# Patient Record
Sex: FEMALE | Race: WHITE | NOT HISPANIC OR LATINO
[De-identification: names, ages, dates, MRNs, and addresses within clinical notes are randomized per-mention and may not be internally consistent; named-entity substitution may affect disease eponyms.]

---

## 2017-03-21 ENCOUNTER — APPOINTMENT (OUTPATIENT)
Dept: INTERNAL MEDICINE | Facility: CLINIC | Age: 82
End: 2017-03-21

## 2017-03-21 VITALS
SYSTOLIC BLOOD PRESSURE: 133 MMHG | HEART RATE: 72 BPM | WEIGHT: 206 LBS | BODY MASS INDEX: 38.89 KG/M2 | HEIGHT: 61 IN | DIASTOLIC BLOOD PRESSURE: 78 MMHG

## 2017-03-22 LAB
ALBUMIN SERPL ELPH-MCNC: 3.7 G/DL
ALP BLD-CCNC: 70 U/L
ALT SERPL-CCNC: 8 U/L
ANION GAP SERPL CALC-SCNC: 18 MMOL/L
AST SERPL-CCNC: 17 U/L
BASOPHILS # BLD AUTO: 0.04 K/UL
BASOPHILS NFR BLD AUTO: 0.4 %
BILIRUB SERPL-MCNC: 0.2 MG/DL
BUN SERPL-MCNC: 44 MG/DL
CALCIUM SERPL-MCNC: 9.4 MG/DL
CHLORIDE SERPL-SCNC: 102 MMOL/L
CHOLEST SERPL-MCNC: 196 MG/DL
CHOLEST/HDLC SERPL: 2.9 RATIO
CO2 SERPL-SCNC: 20 MMOL/L
CREAT SERPL-MCNC: 1.97 MG/DL
EOSINOPHIL # BLD AUTO: 0.26 K/UL
EOSINOPHIL NFR BLD AUTO: 2.8 %
FERRITIN SERPL-MCNC: 35.5 NG/ML
GLUCOSE SERPL-MCNC: 94 MG/DL
HCT VFR BLD CALC: 43.7 %
HDLC SERPL-MCNC: 67 MG/DL
HGB BLD-MCNC: 13.4 G/DL
IMM GRANULOCYTES NFR BLD AUTO: 0.3 %
IRON SATN MFR SERPL: 14 %
IRON SERPL-MCNC: 52 UG/DL
LDLC SERPL CALC-MCNC: 97 MG/DL
LYMPHOCYTES # BLD AUTO: 2.39 K/UL
LYMPHOCYTES NFR BLD AUTO: 25.6 %
MAN DIFF?: NORMAL
MCHC RBC-ENTMCNC: 27.3 PG
MCHC RBC-ENTMCNC: 30.7 GM/DL
MCV RBC AUTO: 89.2 FL
MONOCYTES # BLD AUTO: 0.74 K/UL
MONOCYTES NFR BLD AUTO: 7.9 %
NEUTROPHILS # BLD AUTO: 5.89 K/UL
NEUTROPHILS NFR BLD AUTO: 63 %
PLATELET # BLD AUTO: 259 K/UL
POTASSIUM SERPL-SCNC: 5.2 MMOL/L
PROT SERPL-MCNC: 7.4 G/DL
RBC # BLD: 4.9 M/UL
RBC # FLD: 16.1 %
SAVE SPECIMEN: NORMAL
SODIUM SERPL-SCNC: 140 MMOL/L
TIBC SERPL-MCNC: 365 UG/DL
TRIGL SERPL-MCNC: 158 MG/DL
UIBC SERPL-MCNC: 313 UG/DL
VIT B12 SERPL-MCNC: 328 PG/ML
WBC # FLD AUTO: 9.35 K/UL

## 2017-07-06 ENCOUNTER — NON-APPOINTMENT (OUTPATIENT)
Age: 82
End: 2017-07-06

## 2017-07-06 ENCOUNTER — APPOINTMENT (OUTPATIENT)
Dept: INTERNAL MEDICINE | Facility: CLINIC | Age: 82
End: 2017-07-06

## 2017-07-06 VITALS
SYSTOLIC BLOOD PRESSURE: 136 MMHG | WEIGHT: 201 LBS | TEMPERATURE: 97.5 F | BODY MASS INDEX: 37.95 KG/M2 | HEIGHT: 61 IN | HEART RATE: 102 BPM | DIASTOLIC BLOOD PRESSURE: 75 MMHG

## 2017-07-06 RX ORDER — SULFAMETHOXAZOLE AND TRIMETHOPRIM 800; 160 MG/1; MG/1
800-160 TABLET ORAL
Qty: 20 | Refills: 0 | Status: DISCONTINUED | COMMUNITY
Start: 2017-06-06

## 2017-07-07 LAB
ALBUMIN SERPL ELPH-MCNC: 3.7 G/DL
ALP BLD-CCNC: 71 U/L
ALT SERPL-CCNC: 12 U/L
ANION GAP SERPL CALC-SCNC: 19 MMOL/L
AST SERPL-CCNC: 22 U/L
BASOPHILS # BLD AUTO: 0.02 K/UL
BASOPHILS NFR BLD AUTO: 0.2 %
BILIRUB SERPL-MCNC: 0.3 MG/DL
BUN SERPL-MCNC: 44 MG/DL
CALCIUM SERPL-MCNC: 10.1 MG/DL
CHLORIDE SERPL-SCNC: 104 MMOL/L
CHOLEST SERPL-MCNC: 202 MG/DL
CHOLEST/HDLC SERPL: 4 RATIO
CO2 SERPL-SCNC: 18 MMOL/L
CREAT SERPL-MCNC: 2.25 MG/DL
EOSINOPHIL # BLD AUTO: 0.2 K/UL
EOSINOPHIL NFR BLD AUTO: 2 %
GLUCOSE SERPL-MCNC: 127 MG/DL
HCT VFR BLD CALC: 41.8 %
HDLC SERPL-MCNC: 51 MG/DL
HGB BLD-MCNC: 13 G/DL
IMM GRANULOCYTES NFR BLD AUTO: 0.3 %
LDLC SERPL CALC-MCNC: 118 MG/DL
LYMPHOCYTES # BLD AUTO: 2.18 K/UL
LYMPHOCYTES NFR BLD AUTO: 22 %
MAN DIFF?: NORMAL
MCHC RBC-ENTMCNC: 27.9 PG
MCHC RBC-ENTMCNC: 31.1 GM/DL
MCV RBC AUTO: 89.7 FL
MONOCYTES # BLD AUTO: 0.79 K/UL
MONOCYTES NFR BLD AUTO: 8 %
NEUTROPHILS # BLD AUTO: 6.68 K/UL
NEUTROPHILS NFR BLD AUTO: 67.5 %
PLATELET # BLD AUTO: 228 K/UL
POTASSIUM SERPL-SCNC: 5.1 MMOL/L
PROT SERPL-MCNC: 7.2 G/DL
RBC # BLD: 4.66 M/UL
RBC # FLD: 17.6 %
SAVE SPECIMEN: NORMAL
SODIUM SERPL-SCNC: 141 MMOL/L
TRIGL SERPL-MCNC: 167 MG/DL
WBC # FLD AUTO: 9.9 K/UL

## 2017-07-24 ENCOUNTER — APPOINTMENT (OUTPATIENT)
Dept: INTERNAL MEDICINE | Facility: CLINIC | Age: 82
End: 2017-07-24

## 2017-07-24 VITALS
HEIGHT: 61 IN | HEART RATE: 88 BPM | WEIGHT: 201 LBS | SYSTOLIC BLOOD PRESSURE: 130 MMHG | BODY MASS INDEX: 37.95 KG/M2 | DIASTOLIC BLOOD PRESSURE: 75 MMHG | TEMPERATURE: 98.1 F

## 2017-07-31 ENCOUNTER — APPOINTMENT (OUTPATIENT)
Dept: INTERNAL MEDICINE | Facility: CLINIC | Age: 82
End: 2017-07-31
Payer: MEDICARE

## 2017-07-31 VITALS
HEART RATE: 86 BPM | BODY MASS INDEX: 37.95 KG/M2 | WEIGHT: 201 LBS | SYSTOLIC BLOOD PRESSURE: 187 MMHG | DIASTOLIC BLOOD PRESSURE: 106 MMHG | HEIGHT: 61 IN

## 2017-07-31 PROCEDURE — 36415 COLL VENOUS BLD VENIPUNCTURE: CPT

## 2017-07-31 PROCEDURE — 99214 OFFICE O/P EST MOD 30 MIN: CPT | Mod: 25

## 2017-08-01 LAB
ALBUMIN SERPL ELPH-MCNC: 3.4 G/DL
ALP BLD-CCNC: 67 U/L
ALT SERPL-CCNC: 11 U/L
ANION GAP SERPL CALC-SCNC: 16 MMOL/L
AST SERPL-CCNC: 15 U/L
BASOPHILS # BLD AUTO: 0.03 K/UL
BASOPHILS NFR BLD AUTO: 0.2 %
BILIRUB SERPL-MCNC: 0.3 MG/DL
BUN SERPL-MCNC: 36 MG/DL
CALCIUM SERPL-MCNC: 9.1 MG/DL
CHLORIDE SERPL-SCNC: 102 MMOL/L
CHOLEST SERPL-MCNC: 162 MG/DL
CHOLEST/HDLC SERPL: 3.1 RATIO
CO2 SERPL-SCNC: 21 MMOL/L
CREAT SERPL-MCNC: 1.55 MG/DL
EOSINOPHIL # BLD AUTO: 0.33 K/UL
EOSINOPHIL NFR BLD AUTO: 2.3 %
GLUCOSE SERPL-MCNC: 96 MG/DL
HCT VFR BLD CALC: 39.6 %
HDLC SERPL-MCNC: 52 MG/DL
HGB BLD-MCNC: 12.4 G/DL
IMM GRANULOCYTES NFR BLD AUTO: 1 %
LDLC SERPL CALC-MCNC: 86 MG/DL
LYMPHOCYTES # BLD AUTO: 3.44 K/UL
LYMPHOCYTES NFR BLD AUTO: 23.8 %
MAN DIFF?: NORMAL
MCHC RBC-ENTMCNC: 28.1 PG
MCHC RBC-ENTMCNC: 31.3 GM/DL
MCV RBC AUTO: 89.8 FL
MONOCYTES # BLD AUTO: 1.02 K/UL
MONOCYTES NFR BLD AUTO: 7.1 %
NEUTROPHILS # BLD AUTO: 9.49 K/UL
NEUTROPHILS NFR BLD AUTO: 65.6 %
PLATELET # BLD AUTO: 301 K/UL
POTASSIUM SERPL-SCNC: 5.1 MMOL/L
PROT SERPL-MCNC: 7.3 G/DL
RBC # BLD: 4.41 M/UL
RBC # FLD: 16.9 %
SAVE SPECIMEN: NORMAL
SODIUM SERPL-SCNC: 139 MMOL/L
TRIGL SERPL-MCNC: 121 MG/DL
WBC # FLD AUTO: 14.46 K/UL

## 2017-08-07 ENCOUNTER — FORM ENCOUNTER (OUTPATIENT)
Age: 82
End: 2017-08-07

## 2017-08-08 ENCOUNTER — OUTPATIENT (OUTPATIENT)
Dept: OUTPATIENT SERVICES | Facility: HOSPITAL | Age: 82
LOS: 1 days | End: 2017-08-08
Payer: MEDICARE

## 2017-08-08 ENCOUNTER — APPOINTMENT (OUTPATIENT)
Dept: MRI IMAGING | Facility: CLINIC | Age: 82
End: 2017-08-08
Payer: MEDICARE

## 2017-08-08 DIAGNOSIS — Z96.659 PRESENCE OF UNSPECIFIED ARTIFICIAL KNEE JOINT: Chronic | ICD-10-CM

## 2017-08-08 DIAGNOSIS — Z98.89 OTHER SPECIFIED POSTPROCEDURAL STATES: Chronic | ICD-10-CM

## 2017-08-08 DIAGNOSIS — M54.16 RADICULOPATHY, LUMBAR REGION: ICD-10-CM

## 2017-08-08 DIAGNOSIS — Z00.8 ENCOUNTER FOR OTHER GENERAL EXAMINATION: ICD-10-CM

## 2017-08-08 PROCEDURE — 72148 MRI LUMBAR SPINE W/O DYE: CPT

## 2017-08-08 PROCEDURE — 72148 MRI LUMBAR SPINE W/O DYE: CPT | Mod: 26

## 2017-08-15 ENCOUNTER — APPOINTMENT (OUTPATIENT)
Dept: INTERNAL MEDICINE | Facility: CLINIC | Age: 82
End: 2017-08-15
Payer: MEDICARE

## 2017-08-15 ENCOUNTER — NON-APPOINTMENT (OUTPATIENT)
Age: 82
End: 2017-08-15

## 2017-08-15 VITALS
WEIGHT: 200 LBS | SYSTOLIC BLOOD PRESSURE: 159 MMHG | DIASTOLIC BLOOD PRESSURE: 89 MMHG | BODY MASS INDEX: 37.76 KG/M2 | HEART RATE: 76 BPM | HEIGHT: 61 IN

## 2017-08-15 PROCEDURE — 93000 ELECTROCARDIOGRAM COMPLETE: CPT

## 2017-08-15 PROCEDURE — 36415 COLL VENOUS BLD VENIPUNCTURE: CPT

## 2017-08-15 PROCEDURE — 99215 OFFICE O/P EST HI 40 MIN: CPT | Mod: 25

## 2017-08-15 RX ORDER — PREDNISONE 20 MG/1
20 TABLET ORAL
Qty: 10 | Refills: 0 | Status: DISCONTINUED | COMMUNITY
Start: 2017-07-31 | End: 2017-08-15

## 2017-08-15 RX ORDER — PREDNISONE 20 MG/1
20 TABLET ORAL
Qty: 5 | Refills: 0 | Status: DISCONTINUED | COMMUNITY
Start: 2017-07-24 | End: 2017-08-15

## 2017-08-16 LAB
ALBUMIN SERPL ELPH-MCNC: 3.5 G/DL
ALP BLD-CCNC: 63 U/L
ALT SERPL-CCNC: 4 U/L
ANION GAP SERPL CALC-SCNC: 15 MMOL/L
AST SERPL-CCNC: 10 U/L
BASOPHILS # BLD AUTO: 0.02 K/UL
BASOPHILS NFR BLD AUTO: 0.2 %
BILIRUB SERPL-MCNC: 0.4 MG/DL
BUN SERPL-MCNC: 36 MG/DL
CALCIUM SERPL-MCNC: 9.3 MG/DL
CHLORIDE SERPL-SCNC: 101 MMOL/L
CO2 SERPL-SCNC: 24 MMOL/L
CREAT SERPL-MCNC: 1.92 MG/DL
EOSINOPHIL # BLD AUTO: 0.47 K/UL
EOSINOPHIL NFR BLD AUTO: 5.2 %
GLUCOSE SERPL-MCNC: 95 MG/DL
HCT VFR BLD CALC: 40.2 %
HGB BLD-MCNC: 12.3 G/DL
IMM GRANULOCYTES NFR BLD AUTO: 0.2 %
LYMPHOCYTES # BLD AUTO: 2.28 K/UL
LYMPHOCYTES NFR BLD AUTO: 25.3 %
MAN DIFF?: NORMAL
MCHC RBC-ENTMCNC: 27.8 PG
MCHC RBC-ENTMCNC: 30.6 GM/DL
MCV RBC AUTO: 91 FL
MONOCYTES # BLD AUTO: 1.06 K/UL
MONOCYTES NFR BLD AUTO: 11.8 %
NEUTROPHILS # BLD AUTO: 5.16 K/UL
NEUTROPHILS NFR BLD AUTO: 57.3 %
PLATELET # BLD AUTO: 226 K/UL
POTASSIUM SERPL-SCNC: 4.8 MMOL/L
PROT SERPL-MCNC: 7.2 G/DL
RBC # BLD: 4.42 M/UL
RBC # FLD: 17.3 %
SAVE SPECIMEN: NORMAL
SODIUM SERPL-SCNC: 140 MMOL/L
WBC # FLD AUTO: 9.01 K/UL

## 2017-08-21 ENCOUNTER — APPOINTMENT (OUTPATIENT)
Dept: INTERNAL MEDICINE | Facility: CLINIC | Age: 82
End: 2017-08-21
Payer: MEDICARE

## 2017-08-21 VITALS
DIASTOLIC BLOOD PRESSURE: 91 MMHG | HEIGHT: 61 IN | BODY MASS INDEX: 37.76 KG/M2 | HEART RATE: 105 BPM | SYSTOLIC BLOOD PRESSURE: 159 MMHG | WEIGHT: 200 LBS

## 2017-08-21 PROCEDURE — 99213 OFFICE O/P EST LOW 20 MIN: CPT

## 2017-08-28 ENCOUNTER — RX RENEWAL (OUTPATIENT)
Age: 82
End: 2017-08-28

## 2017-09-25 ENCOUNTER — MED ADMIN CHARGE (OUTPATIENT)
Age: 82
End: 2017-09-25

## 2017-09-25 ENCOUNTER — APPOINTMENT (OUTPATIENT)
Dept: INTERNAL MEDICINE | Facility: CLINIC | Age: 82
End: 2017-09-25
Payer: MEDICARE

## 2017-09-25 ENCOUNTER — RX RENEWAL (OUTPATIENT)
Age: 82
End: 2017-09-25

## 2017-09-25 VITALS
SYSTOLIC BLOOD PRESSURE: 163 MMHG | BODY MASS INDEX: 37.76 KG/M2 | DIASTOLIC BLOOD PRESSURE: 90 MMHG | WEIGHT: 200 LBS | HEART RATE: 74 BPM | HEIGHT: 61 IN

## 2017-09-25 PROCEDURE — 90686 IIV4 VACC NO PRSV 0.5 ML IM: CPT

## 2017-09-25 PROCEDURE — 99214 OFFICE O/P EST MOD 30 MIN: CPT | Mod: 25

## 2017-09-25 PROCEDURE — G0008: CPT

## 2017-10-24 ENCOUNTER — RX RENEWAL (OUTPATIENT)
Age: 82
End: 2017-10-24

## 2017-11-28 ENCOUNTER — APPOINTMENT (OUTPATIENT)
Dept: INTERNAL MEDICINE | Facility: CLINIC | Age: 82
End: 2017-11-28
Payer: MEDICARE

## 2017-11-28 ENCOUNTER — LABORATORY RESULT (OUTPATIENT)
Age: 82
End: 2017-11-28

## 2017-11-28 ENCOUNTER — NON-APPOINTMENT (OUTPATIENT)
Age: 82
End: 2017-11-28

## 2017-11-28 VITALS
HEIGHT: 61 IN | DIASTOLIC BLOOD PRESSURE: 72 MMHG | HEART RATE: 81 BPM | WEIGHT: 192 LBS | SYSTOLIC BLOOD PRESSURE: 113 MMHG | BODY MASS INDEX: 36.25 KG/M2

## 2017-11-28 PROCEDURE — 99214 OFFICE O/P EST MOD 30 MIN: CPT | Mod: 25

## 2017-11-28 PROCEDURE — 36415 COLL VENOUS BLD VENIPUNCTURE: CPT

## 2017-11-28 PROCEDURE — 93000 ELECTROCARDIOGRAM COMPLETE: CPT

## 2017-11-29 LAB
BASOPHILS # BLD AUTO: 0.03 K/UL
BASOPHILS NFR BLD AUTO: 0.4 %
CHOLEST SERPL-MCNC: 157 MG/DL
CHOLEST/HDLC SERPL: 2.6 RATIO
EOSINOPHIL # BLD AUTO: 0.18 K/UL
EOSINOPHIL NFR BLD AUTO: 2.2 %
GLUCOSE SERPL-MCNC: 111 MG/DL
HCT VFR BLD CALC: 43.2 %
HDLC SERPL-MCNC: 60 MG/DL
HGB BLD-MCNC: 13.2 G/DL
IMM GRANULOCYTES NFR BLD AUTO: 0.1 %
LDLC SERPL CALC-MCNC: 75 MG/DL
LYMPHOCYTES # BLD AUTO: 2.3 K/UL
LYMPHOCYTES NFR BLD AUTO: 28.3 %
MAN DIFF?: NORMAL
MCHC RBC-ENTMCNC: 28 PG
MCHC RBC-ENTMCNC: 30.6 GM/DL
MCV RBC AUTO: 91.5 FL
MONOCYTES # BLD AUTO: 0.61 K/UL
MONOCYTES NFR BLD AUTO: 7.5 %
NEUTROPHILS # BLD AUTO: 5 K/UL
NEUTROPHILS NFR BLD AUTO: 61.5 %
PLATELET # BLD AUTO: 255 K/UL
RBC # FLD: 16.9 %
SAVE SPECIMEN: NORMAL
TRIGL SERPL-MCNC: 111 MG/DL
WBC # FLD AUTO: 8.13 K/UL

## 2017-11-29 RX ORDER — CIPROFLOXACIN HYDROCHLORIDE 500 MG/1
TABLET, FILM COATED ORAL
Refills: 0 | Status: DISCONTINUED | COMMUNITY
End: 2017-11-29

## 2017-12-14 LAB
ALBUMIN SERPL ELPH-MCNC: 3.5 G/DL
ALP BLD-CCNC: 62 U/L
ALT SERPL-CCNC: 9 U/L
ANION GAP SERPL CALC-SCNC: 20 MMOL/L
AST SERPL-CCNC: 15 U/L
BILIRUB SERPL-MCNC: 0.3 MG/DL
BUN SERPL-MCNC: 45 MG/DL
CALCIUM SERPL-MCNC: 10 MG/DL
CHLORIDE SERPL-SCNC: 102 MMOL/L
CO2 SERPL-SCNC: 19 MMOL/L
CREAT SERPL-MCNC: 2.34 MG/DL
POTASSIUM SERPL-SCNC: 5.5 MMOL/L
PROT SERPL-MCNC: 6.8 G/DL
SODIUM SERPL-SCNC: 141 MMOL/L

## 2018-01-30 ENCOUNTER — APPOINTMENT (OUTPATIENT)
Dept: INTERNAL MEDICINE | Facility: CLINIC | Age: 83
End: 2018-01-30

## 2018-02-06 ENCOUNTER — APPOINTMENT (OUTPATIENT)
Dept: INTERNAL MEDICINE | Facility: CLINIC | Age: 83
End: 2018-02-06
Payer: MEDICARE

## 2018-02-06 ENCOUNTER — LABORATORY RESULT (OUTPATIENT)
Age: 83
End: 2018-02-06

## 2018-02-06 ENCOUNTER — APPOINTMENT (OUTPATIENT)
Dept: WOUND CARE | Facility: CLINIC | Age: 83
End: 2018-02-06
Payer: MEDICARE

## 2018-02-06 VITALS
HEIGHT: 61 IN | BODY MASS INDEX: 36.06 KG/M2 | WEIGHT: 191 LBS | RESPIRATION RATE: 16 BRPM | SYSTOLIC BLOOD PRESSURE: 148 MMHG | TEMPERATURE: 97.6 F | DIASTOLIC BLOOD PRESSURE: 63 MMHG | HEART RATE: 77 BPM

## 2018-02-06 VITALS
HEART RATE: 76 BPM | BODY MASS INDEX: 36.09 KG/M2 | DIASTOLIC BLOOD PRESSURE: 80 MMHG | SYSTOLIC BLOOD PRESSURE: 140 MMHG | WEIGHT: 191 LBS

## 2018-02-06 DIAGNOSIS — R26.81 UNSTEADINESS ON FEET: ICD-10-CM

## 2018-02-06 DIAGNOSIS — Z86.711 PERSONAL HISTORY OF PULMONARY EMBOLISM: ICD-10-CM

## 2018-02-06 DIAGNOSIS — Z82.49 FAMILY HISTORY OF ISCHEMIC HEART DISEASE AND OTHER DISEASES OF THE CIRCULATORY SYSTEM: ICD-10-CM

## 2018-02-06 DIAGNOSIS — Z86.79 PERSONAL HISTORY OF OTHER DISEASES OF THE CIRCULATORY SYSTEM: ICD-10-CM

## 2018-02-06 DIAGNOSIS — M54.16 RADICULOPATHY, LUMBAR REGION: ICD-10-CM

## 2018-02-06 PROCEDURE — 99204 OFFICE O/P NEW MOD 45 MIN: CPT

## 2018-02-06 PROCEDURE — 99214 OFFICE O/P EST MOD 30 MIN: CPT | Mod: 25

## 2018-02-06 PROCEDURE — 36415 COLL VENOUS BLD VENIPUNCTURE: CPT

## 2018-02-07 PROBLEM — M54.16 LUMBAR RADICULOPATHY: Status: RESOLVED | Noted: 2017-07-31 | Resolved: 2018-02-07

## 2018-02-07 LAB
ALBUMIN SERPL ELPH-MCNC: 4 G/DL
ALP BLD-CCNC: 58 U/L
ALT SERPL-CCNC: 9 U/L
ANION GAP SERPL CALC-SCNC: 16 MMOL/L
AST SERPL-CCNC: 18 U/L
BASOPHILS # BLD AUTO: 0.04 K/UL
BASOPHILS NFR BLD AUTO: 0.4 %
BILIRUB SERPL-MCNC: 0.4 MG/DL
BUN SERPL-MCNC: 35 MG/DL
CALCIUM SERPL-MCNC: 10 MG/DL
CHLORIDE SERPL-SCNC: 103 MMOL/L
CHOLEST SERPL-MCNC: 170 MG/DL
CHOLEST/HDLC SERPL: 2.6 RATIO
CO2 SERPL-SCNC: 24 MMOL/L
CREAT SERPL-MCNC: 1.64 MG/DL
EOSINOPHIL # BLD AUTO: 0.37 K/UL
EOSINOPHIL NFR BLD AUTO: 4.1 %
GLUCOSE SERPL-MCNC: 107 MG/DL
HCT VFR BLD CALC: 44.9 %
HDLC SERPL-MCNC: 65 MG/DL
HGB BLD-MCNC: 13.8 G/DL
IMM GRANULOCYTES NFR BLD AUTO: 0.2 %
LDLC SERPL CALC-MCNC: 74 MG/DL
LYMPHOCYTES # BLD AUTO: 2.18 K/UL
LYMPHOCYTES NFR BLD AUTO: 24.3 %
MAN DIFF?: NORMAL
MCHC RBC-ENTMCNC: 28.9 PG
MCHC RBC-ENTMCNC: 30.7 GM/DL
MCV RBC AUTO: 93.9 FL
MONOCYTES # BLD AUTO: 0.74 K/UL
MONOCYTES NFR BLD AUTO: 8.2 %
NEUTROPHILS # BLD AUTO: 5.62 K/UL
NEUTROPHILS NFR BLD AUTO: 62.8 %
PLATELET # BLD AUTO: 256 K/UL
POTASSIUM SERPL-SCNC: 5.7 MMOL/L
PROT SERPL-MCNC: 7.5 G/DL
RBC # BLD: 4.78 M/UL
RBC # FLD: 18.1 %
SODIUM SERPL-SCNC: 143 MMOL/L
TRIGL SERPL-MCNC: 156 MG/DL
WBC # FLD AUTO: 8.97 K/UL

## 2018-02-23 ENCOUNTER — APPOINTMENT (OUTPATIENT)
Dept: WOUND CARE | Facility: CLINIC | Age: 83
End: 2018-02-23
Payer: MEDICARE

## 2018-02-23 VITALS
DIASTOLIC BLOOD PRESSURE: 79 MMHG | WEIGHT: 191 LBS | HEART RATE: 65 BPM | RESPIRATION RATE: 16 BRPM | HEIGHT: 61 IN | TEMPERATURE: 97 F | SYSTOLIC BLOOD PRESSURE: 135 MMHG | BODY MASS INDEX: 36.06 KG/M2

## 2018-02-23 DIAGNOSIS — M54.16 RADICULOPATHY, LUMBAR REGION: ICD-10-CM

## 2018-02-23 DIAGNOSIS — Z85.3 PERSONAL HISTORY OF MALIGNANT NEOPLASM OF BREAST: ICD-10-CM

## 2018-02-23 PROCEDURE — 99213 OFFICE O/P EST LOW 20 MIN: CPT

## 2018-03-15 PROBLEM — M54.16 LUMBAR RADICULOPATHY: Status: ACTIVE | Noted: 2017-08-21

## 2018-03-23 ENCOUNTER — APPOINTMENT (OUTPATIENT)
Dept: WOUND CARE | Facility: CLINIC | Age: 83
End: 2018-03-23

## 2018-03-27 ENCOUNTER — APPOINTMENT (OUTPATIENT)
Dept: INTERNAL MEDICINE | Facility: CLINIC | Age: 83
End: 2018-03-27
Payer: MEDICARE

## 2018-03-27 VITALS
WEIGHT: 189 LBS | DIASTOLIC BLOOD PRESSURE: 76 MMHG | SYSTOLIC BLOOD PRESSURE: 129 MMHG | HEART RATE: 69 BPM | HEIGHT: 61 IN | BODY MASS INDEX: 35.68 KG/M2

## 2018-03-27 PROCEDURE — 99214 OFFICE O/P EST MOD 30 MIN: CPT

## 2018-04-17 ENCOUNTER — NON-APPOINTMENT (OUTPATIENT)
Age: 83
End: 2018-04-17

## 2018-04-17 ENCOUNTER — APPOINTMENT (OUTPATIENT)
Dept: INTERNAL MEDICINE | Facility: CLINIC | Age: 83
End: 2018-04-17
Payer: MEDICARE

## 2018-04-17 VITALS
SYSTOLIC BLOOD PRESSURE: 128 MMHG | BODY MASS INDEX: 35.3 KG/M2 | WEIGHT: 187 LBS | DIASTOLIC BLOOD PRESSURE: 80 MMHG | HEART RATE: 94 BPM | HEIGHT: 61 IN

## 2018-04-17 DIAGNOSIS — R55 SYNCOPE AND COLLAPSE: ICD-10-CM

## 2018-04-17 DIAGNOSIS — L89.312 PRESSURE ULCER OF RIGHT BUTTOCK, STAGE 2: ICD-10-CM

## 2018-04-17 DIAGNOSIS — Z87.81 PERSONAL HISTORY OF (HEALED) TRAUMATIC FRACTURE: ICD-10-CM

## 2018-04-17 DIAGNOSIS — M54.30 SCIATICA, UNSPECIFIED SIDE: ICD-10-CM

## 2018-04-17 PROCEDURE — 99214 OFFICE O/P EST MOD 30 MIN: CPT | Mod: 25

## 2018-04-17 PROCEDURE — 93000 ELECTROCARDIOGRAM COMPLETE: CPT

## 2018-04-17 RX ORDER — CEPHALEXIN 500 MG/1
500 CAPSULE ORAL 3 TIMES DAILY
Qty: 21 | Refills: 0 | Status: DISCONTINUED | COMMUNITY
Start: 2018-03-27 | End: 2018-04-17

## 2018-07-17 ENCOUNTER — APPOINTMENT (OUTPATIENT)
Dept: INTERNAL MEDICINE | Facility: CLINIC | Age: 83
End: 2018-07-17
Payer: MEDICARE

## 2018-07-17 ENCOUNTER — LABORATORY RESULT (OUTPATIENT)
Age: 83
End: 2018-07-17

## 2018-07-17 VITALS
HEART RATE: 67 BPM | SYSTOLIC BLOOD PRESSURE: 98 MMHG | HEIGHT: 61 IN | WEIGHT: 187 LBS | DIASTOLIC BLOOD PRESSURE: 76 MMHG | BODY MASS INDEX: 35.3 KG/M2

## 2018-07-17 DIAGNOSIS — Z01.818 ENCOUNTER FOR OTHER PREPROCEDURAL EXAMINATION: ICD-10-CM

## 2018-07-17 DIAGNOSIS — G50.0 TRIGEMINAL NEURALGIA: ICD-10-CM

## 2018-07-17 DIAGNOSIS — S80.11XA CONTUSION OF RIGHT LOWER LEG, INITIAL ENCOUNTER: ICD-10-CM

## 2018-07-17 PROCEDURE — 99214 OFFICE O/P EST MOD 30 MIN: CPT | Mod: 25

## 2018-07-17 PROCEDURE — 36415 COLL VENOUS BLD VENIPUNCTURE: CPT

## 2018-07-18 LAB
ALBUMIN SERPL ELPH-MCNC: 4.1 G/DL
ALP BLD-CCNC: 64 U/L
ALT SERPL-CCNC: 12 U/L
ANION GAP SERPL CALC-SCNC: 18 MMOL/L
AST SERPL-CCNC: 20 U/L
BASOPHILS # BLD AUTO: 0.03 K/UL
BASOPHILS NFR BLD AUTO: 0.4 %
BILIRUB SERPL-MCNC: 0.3 MG/DL
BUN SERPL-MCNC: 46 MG/DL
CALCIUM SERPL-MCNC: 9.4 MG/DL
CHLORIDE SERPL-SCNC: 102 MMOL/L
CHOLEST SERPL-MCNC: 146 MG/DL
CHOLEST/HDLC SERPL: 2.3 RATIO
CO2 SERPL-SCNC: 19 MMOL/L
CREAT SERPL-MCNC: 2.48 MG/DL
EOSINOPHIL # BLD AUTO: 0.18 K/UL
EOSINOPHIL NFR BLD AUTO: 2.2 %
GLUCOSE SERPL-MCNC: 80 MG/DL
HCT VFR BLD CALC: 40.3 %
HDLC SERPL-MCNC: 64 MG/DL
HGB BLD-MCNC: 13 G/DL
IMM GRANULOCYTES NFR BLD AUTO: 0.1 %
LDLC SERPL CALC-MCNC: 58 MG/DL
LYMPHOCYTES # BLD AUTO: 2.2 K/UL
LYMPHOCYTES NFR BLD AUTO: 27.4 %
MAN DIFF?: NORMAL
MCHC RBC-ENTMCNC: 31.9 PG
MCHC RBC-ENTMCNC: 32.3 GM/DL
MCV RBC AUTO: 99 FL
MONOCYTES # BLD AUTO: 0.6 K/UL
MONOCYTES NFR BLD AUTO: 7.5 %
NEUTROPHILS # BLD AUTO: 5 K/UL
NEUTROPHILS NFR BLD AUTO: 62.4 %
PLATELET # BLD AUTO: 248 K/UL
POTASSIUM SERPL-SCNC: 6.6 MMOL/L
PROT SERPL-MCNC: 6.8 G/DL
RBC # BLD: 4.07 M/UL
RBC # FLD: 15.2 %
SAVE SPECIMEN: NORMAL
SODIUM SERPL-SCNC: 139 MMOL/L
TRIGL SERPL-MCNC: 118 MG/DL
WBC # FLD AUTO: 8.02 K/UL

## 2018-07-18 NOTE — PHYSICAL EXAM
[No Acute Distress] : no acute distress [Well Nourished] : well nourished [Well Developed] : well developed [Supple] : supple [No Lymphadenopathy] : no lymphadenopathy [No Respiratory Distress] : no respiratory distress  [Clear to Auscultation] : lungs were clear to auscultation bilaterally [No Accessory Muscle Use] : no accessory muscle use [Normal Rate] : normal rate  [No Edema] : there was no peripheral edema [Soft] : abdomen soft [Non Tender] : non-tender [No HSM] : no HSM [Normal Bowel Sounds] : normal bowel sounds [de-identified] : Severely discolred feet bilaterally.

## 2018-07-18 NOTE — REVIEW OF SYSTEMS
[Fever] : no fever [Chills] : no chills [Night Sweats] : no night sweats [Hearing Loss] : hearing loss [Chest Pain] : no chest pain [Palpitations] : no palpitations [Orthopnea] : no orthopnea [Abdominal Pain] : no abdominal pain [Nausea] : no nausea [Vomiting] : no vomiting [Heartburn] : no heartburn

## 2018-07-18 NOTE — ADDENDUM
[FreeTextEntry1] : Urinary Frequency:\par No work up necessary.\par \par Contusion of Right calf:\par Skin not broken.  No specific treatment\par \par Hypertension:\par Blood pressure is relatively low.\par Will stop Hydrochlorothiazide.\par \par Trigeminal Neuralgia:\par Continue Neurontin\par \par Morbid Obesity:\par Told to watch her calories.\par \par CKD:\par Get BUN and Creatinine.

## 2018-07-18 NOTE — HISTORY OF PRESENT ILLNESS
[FreeTextEntry1] : CC: The patient is here to follow up on her chronic medical problems.\par  [de-identified] : HPI:\par Urinary Burning- was started on antibiotics.\par Bactrim.\par \par Urinary Frequency:\par Chronic problem but getting worse.\par \par CKD:\par No symptoms.\par \par Banged her right cald on the step about 3 days ago.  Denies pain or bleeding.\par Covered the area with a gauze pad.\par \par Trigeminal Neuralgia:\par Had some symptoms and she increased her Neurontin which seemed to help with facial pain.\par

## 2018-07-27 ENCOUNTER — APPOINTMENT (OUTPATIENT)
Dept: INTERNAL MEDICINE | Facility: CLINIC | Age: 83
End: 2018-07-27
Payer: MEDICARE

## 2018-07-27 ENCOUNTER — LABORATORY RESULT (OUTPATIENT)
Age: 83
End: 2018-07-27

## 2018-07-27 PROCEDURE — 36415 COLL VENOUS BLD VENIPUNCTURE: CPT

## 2018-07-28 ENCOUNTER — CLINICAL ADVICE (OUTPATIENT)
Age: 83
End: 2018-07-28

## 2018-07-30 ENCOUNTER — APPOINTMENT (OUTPATIENT)
Dept: INTERNAL MEDICINE | Facility: CLINIC | Age: 83
End: 2018-07-30
Payer: MEDICARE

## 2018-07-30 ENCOUNTER — NON-APPOINTMENT (OUTPATIENT)
Age: 83
End: 2018-07-30

## 2018-07-30 ENCOUNTER — LABORATORY RESULT (OUTPATIENT)
Age: 83
End: 2018-07-30

## 2018-07-30 VITALS
DIASTOLIC BLOOD PRESSURE: 69 MMHG | HEIGHT: 61 IN | WEIGHT: 189 LBS | SYSTOLIC BLOOD PRESSURE: 135 MMHG | BODY MASS INDEX: 35.68 KG/M2 | HEART RATE: 76 BPM

## 2018-07-30 LAB
POTASSIUM SERPL-SCNC: 6.9 MMOL/L
SAVE SPECIMEN: NORMAL

## 2018-07-30 PROCEDURE — 99213 OFFICE O/P EST LOW 20 MIN: CPT | Mod: 25

## 2018-07-30 PROCEDURE — 93000 ELECTROCARDIOGRAM COMPLETE: CPT

## 2018-07-30 PROCEDURE — 36415 COLL VENOUS BLD VENIPUNCTURE: CPT

## 2018-07-30 RX ORDER — LOSARTAN POTASSIUM 50 MG/1
50 TABLET, FILM COATED ORAL DAILY
Qty: 90 | Refills: 3 | Status: DISCONTINUED | COMMUNITY
Start: 2018-07-17 | End: 2018-07-30

## 2018-07-31 LAB
ALBUMIN SERPL ELPH-MCNC: 3.8 G/DL
ALP BLD-CCNC: 58 U/L
ALT SERPL-CCNC: 10 U/L
ANION GAP SERPL CALC-SCNC: 15 MMOL/L
AST SERPL-CCNC: 15 U/L
BASOPHILS # BLD AUTO: 0.04 K/UL
BASOPHILS NFR BLD AUTO: 0.6 %
BILIRUB SERPL-MCNC: 0.3 MG/DL
BUN SERPL-MCNC: 36 MG/DL
CALCIUM SERPL-MCNC: 8.8 MG/DL
CHLORIDE SERPL-SCNC: 108 MMOL/L
CO2 SERPL-SCNC: 20 MMOL/L
CREAT SERPL-MCNC: 1.55 MG/DL
EOSINOPHIL # BLD AUTO: 0.16 K/UL
EOSINOPHIL NFR BLD AUTO: 2.4 %
GLUCOSE SERPL-MCNC: 99 MG/DL
HCT VFR BLD CALC: 39.1 %
HGB BLD-MCNC: 12 G/DL
IMM GRANULOCYTES NFR BLD AUTO: 0.2 %
LYMPHOCYTES # BLD AUTO: 1.83 K/UL
LYMPHOCYTES NFR BLD AUTO: 27.5 %
MAN DIFF?: NORMAL
MCHC RBC-ENTMCNC: 30.7 GM/DL
MCHC RBC-ENTMCNC: 31 PG
MCV RBC AUTO: 101 FL
MONOCYTES # BLD AUTO: 0.54 K/UL
MONOCYTES NFR BLD AUTO: 8.1 %
NEUTROPHILS # BLD AUTO: 4.07 K/UL
NEUTROPHILS NFR BLD AUTO: 61.2 %
PLATELET # BLD AUTO: 237 K/UL
POTASSIUM SERPL-SCNC: 4.2 MMOL/L
POTASSIUM SERPL-SCNC: 4.2 MMOL/L
PROT SERPL-MCNC: 6.1 G/DL
RBC # BLD: 3.87 M/UL
RBC # FLD: 15.4 %
SODIUM SERPL-SCNC: 143 MMOL/L
WBC # FLD AUTO: 6.65 K/UL

## 2018-07-31 NOTE — PHYSICAL EXAM
[No Acute Distress] : no acute distress [Well Nourished] : well nourished [Well Developed] : well developed [No Respiratory Distress] : no respiratory distress  [Clear to Auscultation] : lungs were clear to auscultation bilaterally [No Accessory Muscle Use] : no accessory muscle use [Normal Rate] : normal rate  [Regular Rhythm] : with a regular rhythm [Normal S1, S2] : normal S1 and S2 [Soft] : abdomen soft [Non Tender] : non-tender [Non-distended] : non-distended [No HSM] : no HSM [Normal Bowel Sounds] : normal bowel sounds [No Hernias] : no hernias

## 2018-07-31 NOTE — HISTORY OF PRESENT ILLNESS
[de-identified] : 89 year old female here today for a follow up of her hyperkalemia.  She was started on Kaexalate Friday night, 12 tsp Friday and then 5 tsp currently.   She is feeling well and having no diarrhea.  \par Her potassium  was high 6.9 on Friday. Prior it was 5.9. \par \par \par

## 2018-07-31 NOTE — ASSESSMENT
[FreeTextEntry1] : Hyperkalemia: EKG done--stable\par Routine labs--serum  K and Plasma K \par continue Kaexalate \par fu based on labs

## 2018-08-13 ENCOUNTER — NON-APPOINTMENT (OUTPATIENT)
Age: 83
End: 2018-08-13

## 2018-08-13 ENCOUNTER — INPATIENT (INPATIENT)
Facility: HOSPITAL | Age: 83
LOS: 1 days | Discharge: HOME HEALTH | End: 2018-08-15
Attending: HOSPITALIST | Admitting: HOSPITALIST
Payer: MEDICARE

## 2018-08-13 ENCOUNTER — APPOINTMENT (OUTPATIENT)
Dept: INTERNAL MEDICINE | Facility: CLINIC | Age: 83
End: 2018-08-13
Payer: MEDICARE

## 2018-08-13 VITALS
DIASTOLIC BLOOD PRESSURE: 68 MMHG | SYSTOLIC BLOOD PRESSURE: 100 MMHG | RESPIRATION RATE: 18 BRPM | HEART RATE: 73 BPM | TEMPERATURE: 98 F | OXYGEN SATURATION: 98 %

## 2018-08-13 VITALS — OXYGEN SATURATION: 95 % | HEIGHT: 61 IN | BODY MASS INDEX: 35.3 KG/M2 | WEIGHT: 187 LBS

## 2018-08-13 VITALS — SYSTOLIC BLOOD PRESSURE: 70 MMHG | DIASTOLIC BLOOD PRESSURE: 40 MMHG

## 2018-08-13 DIAGNOSIS — Z96.659 PRESENCE OF UNSPECIFIED ARTIFICIAL KNEE JOINT: Chronic | ICD-10-CM

## 2018-08-13 DIAGNOSIS — Z98.89 OTHER SPECIFIED POSTPROCEDURAL STATES: Chronic | ICD-10-CM

## 2018-08-13 DIAGNOSIS — N12 TUBULO-INTERSTITIAL NEPHRITIS, NOT SPECIFIED AS ACUTE OR CHRONIC: ICD-10-CM

## 2018-08-13 LAB
ALBUMIN SERPL ELPH-MCNC: 3.2 G/DL — LOW (ref 3.3–5)
ALP SERPL-CCNC: 109 U/L — SIGNIFICANT CHANGE UP (ref 40–120)
ALT FLD-CCNC: 21 U/L — SIGNIFICANT CHANGE UP (ref 4–33)
APPEARANCE UR: SIGNIFICANT CHANGE UP
AST SERPL-CCNC: 26 U/L — SIGNIFICANT CHANGE UP (ref 4–32)
BACTERIA # UR AUTO: SIGNIFICANT CHANGE UP
BASOPHILS # BLD AUTO: 0.05 K/UL — SIGNIFICANT CHANGE UP (ref 0–0.2)
BASOPHILS NFR BLD AUTO: 0.3 % — SIGNIFICANT CHANGE UP (ref 0–2)
BILIRUB SERPL-MCNC: 0.7 MG/DL — SIGNIFICANT CHANGE UP (ref 0.2–1.2)
BILIRUB UR-MCNC: SIGNIFICANT CHANGE UP
BLOOD UR QL VISUAL: HIGH
BUN SERPL-MCNC: 44 MG/DL — HIGH (ref 7–23)
CALCIUM SERPL-MCNC: 8.9 MG/DL — SIGNIFICANT CHANGE UP (ref 8.4–10.5)
CHLORIDE SERPL-SCNC: 99 MMOL/L — SIGNIFICANT CHANGE UP (ref 98–107)
CO2 SERPL-SCNC: 25 MMOL/L — SIGNIFICANT CHANGE UP (ref 22–31)
COLOR SPEC: YELLOW — SIGNIFICANT CHANGE UP
CREAT SERPL-MCNC: 2.25 MG/DL — HIGH (ref 0.5–1.3)
EOSINOPHIL # BLD AUTO: 0.03 K/UL — SIGNIFICANT CHANGE UP (ref 0–0.5)
EOSINOPHIL NFR BLD AUTO: 0.2 % — SIGNIFICANT CHANGE UP (ref 0–6)
GLUCOSE SERPL-MCNC: 152 MG/DL — HIGH (ref 70–99)
GLUCOSE UR-MCNC: NEGATIVE — SIGNIFICANT CHANGE UP
HCT VFR BLD CALC: 38.2 % — SIGNIFICANT CHANGE UP (ref 34.5–45)
HGB BLD-MCNC: 12.1 G/DL — SIGNIFICANT CHANGE UP (ref 11.5–15.5)
IMM GRANULOCYTES # BLD AUTO: 0.28 # — SIGNIFICANT CHANGE UP
IMM GRANULOCYTES NFR BLD AUTO: 1.5 % — SIGNIFICANT CHANGE UP (ref 0–1.5)
KETONES UR-MCNC: NEGATIVE — SIGNIFICANT CHANGE UP
LEUKOCYTE ESTERASE UR-ACNC: HIGH
LYMPHOCYTES # BLD AUTO: 1.84 K/UL — SIGNIFICANT CHANGE UP (ref 1–3.3)
LYMPHOCYTES # BLD AUTO: 10 % — LOW (ref 13–44)
MCHC RBC-ENTMCNC: 30.9 PG — SIGNIFICANT CHANGE UP (ref 27–34)
MCHC RBC-ENTMCNC: 31.7 % — LOW (ref 32–36)
MCV RBC AUTO: 97.4 FL — SIGNIFICANT CHANGE UP (ref 80–100)
MONOCYTES # BLD AUTO: 1.23 K/UL — HIGH (ref 0–0.9)
MONOCYTES NFR BLD AUTO: 6.7 % — SIGNIFICANT CHANGE UP (ref 2–14)
NEUTROPHILS # BLD AUTO: 15.01 K/UL — HIGH (ref 1.8–7.4)
NEUTROPHILS NFR BLD AUTO: 81.3 % — HIGH (ref 43–77)
NITRITE UR-MCNC: NEGATIVE — SIGNIFICANT CHANGE UP
NRBC # FLD: 0 — SIGNIFICANT CHANGE UP
PH UR: 6 — SIGNIFICANT CHANGE UP (ref 4.6–8)
PLATELET # BLD AUTO: 242 K/UL — SIGNIFICANT CHANGE UP (ref 150–400)
PMV BLD: 11.1 FL — SIGNIFICANT CHANGE UP (ref 7–13)
POTASSIUM SERPL-MCNC: 3 MMOL/L — LOW (ref 3.5–5.3)
POTASSIUM SERPL-SCNC: 3 MMOL/L — LOW (ref 3.5–5.3)
PROT SERPL-MCNC: 6.5 G/DL — SIGNIFICANT CHANGE UP (ref 6–8.3)
PROT UR-MCNC: 500 MG/DL — HIGH
RBC # BLD: 3.92 M/UL — SIGNIFICANT CHANGE UP (ref 3.8–5.2)
RBC # FLD: 14.8 % — HIGH (ref 10.3–14.5)
RBC CASTS # UR COMP ASSIST: HIGH (ref 0–?)
SODIUM SERPL-SCNC: 142 MMOL/L — SIGNIFICANT CHANGE UP (ref 135–145)
SP GR SPEC: 1.02 — SIGNIFICANT CHANGE UP (ref 1–1.04)
SQUAMOUS # UR AUTO: SIGNIFICANT CHANGE UP
UROBILINOGEN FLD QL: 2 MG/DL — HIGH
WBC # BLD: 18.44 K/UL — HIGH (ref 3.8–10.5)
WBC # FLD AUTO: 18.44 K/UL — HIGH (ref 3.8–10.5)
WBC CLUMPS #/AREA URNS HPF: PRESENT — HIGH (ref 0–?)
WBC UR QL: >50 — HIGH (ref 0–?)

## 2018-08-13 PROCEDURE — 99215 OFFICE O/P EST HI 40 MIN: CPT | Mod: 25,PD

## 2018-08-13 PROCEDURE — 93000 ELECTROCARDIOGRAM COMPLETE: CPT | Mod: PD

## 2018-08-13 RX ORDER — METOPROLOL TARTRATE 50 MG
25 TABLET ORAL ONCE
Qty: 0 | Refills: 0 | Status: DISCONTINUED | OUTPATIENT
Start: 2018-08-13 | End: 2018-08-13

## 2018-08-13 RX ORDER — METOPROLOL TARTRATE 50 MG
12.5 TABLET ORAL ONCE
Qty: 0 | Refills: 0 | Status: COMPLETED | OUTPATIENT
Start: 2018-08-13 | End: 2018-08-14

## 2018-08-13 RX ORDER — POTASSIUM CHLORIDE 20 MEQ
40 PACKET (EA) ORAL ONCE
Qty: 0 | Refills: 0 | Status: COMPLETED | OUTPATIENT
Start: 2018-08-13 | End: 2018-08-13

## 2018-08-13 RX ORDER — SODIUM CHLORIDE 9 MG/ML
500 INJECTION INTRAMUSCULAR; INTRAVENOUS; SUBCUTANEOUS ONCE
Qty: 0 | Refills: 0 | Status: COMPLETED | OUTPATIENT
Start: 2018-08-13 | End: 2018-08-13

## 2018-08-13 RX ORDER — GABAPENTIN 400 MG/1
300 CAPSULE ORAL ONCE
Qty: 0 | Refills: 0 | Status: COMPLETED | OUTPATIENT
Start: 2018-08-13 | End: 2018-08-14

## 2018-08-13 RX ORDER — CEFTRIAXONE 500 MG/1
1 INJECTION, POWDER, FOR SOLUTION INTRAMUSCULAR; INTRAVENOUS ONCE
Qty: 0 | Refills: 0 | Status: COMPLETED | OUTPATIENT
Start: 2018-08-13 | End: 2018-08-13

## 2018-08-13 RX ADMIN — CEFTRIAXONE 100 GRAM(S): 500 INJECTION, POWDER, FOR SOLUTION INTRAMUSCULAR; INTRAVENOUS at 18:50

## 2018-08-13 RX ADMIN — Medication 40 MILLIEQUIVALENT(S): at 18:41

## 2018-08-13 RX ADMIN — CEFTRIAXONE 1 GRAM(S): 500 INJECTION, POWDER, FOR SOLUTION INTRAMUSCULAR; INTRAVENOUS at 19:52

## 2018-08-13 RX ADMIN — SODIUM CHLORIDE 500 MILLILITER(S): 9 INJECTION INTRAMUSCULAR; INTRAVENOUS; SUBCUTANEOUS at 17:30

## 2018-08-13 NOTE — ED PROVIDER NOTE - PROGRESS NOTE DETAILS
Post void residual volume <50ml  Tima Evangelista, PGY-1 Discussed results of labs with patient and family, discussed admission for UTI.  Tima Evangelista, PGY-1 Spoke with her primary Dr. Shelton, he states that she was seen by him today and that she had afib, which she had not had in years, and that she is not anticoagulated because of her comorbidities.  She also had low blood pressure in his office, a systolic of 90.  She was treated for hyperkalemia a few weeks ago with every other day kaexylate and he cautioned for slow repletion for her hypokalemia.  He agreed with admission for her UTI and said to admit to the hospitalist.  Tima Evangelista, PGY-1

## 2018-08-13 NOTE — ED ADULT NURSE NOTE - OBJECTIVE STATEMENT
Received pt room 22 AOX4  in NAD, c/o urinary retention, chills, and decrease in PO intake X3 days family states symptoms began after "shaking" episode during which she remained conscious and stopped after receiving valium. PMH includes Afib, HTN, multiple UTIs. Pt denies any pain upon urination or pain in any other places. Bowel sounds present and last BM was watery yesterday. Bilateral edema and coolness to lower extremities family and pt state this is baseline for several years. IV placed, labs sent on cardiac monitor, will continue to monitor.

## 2018-08-13 NOTE — ED PROVIDER NOTE - ATTENDING CONTRIBUTION TO CARE
Attending note:   After face to face evaluation of this patient, I concur with above noted hx, pe, and care plan for this patient.  89 y/o F with fever, chills for 24 hours and decreased ability to urinate.   No suprapubic pain; evaluation in progress

## 2018-08-13 NOTE — ED PROVIDER NOTE - MEDICAL DECISION MAKING DETAILS
89y female with difficulty urinating for 1 day.  DDx urinary retention, UTI, dehydration with low urine output.  US to check for retention, IV fluids to increase urine volume, see if improvement in symptoms.  UA and culture to check for UTI.

## 2018-08-13 NOTE — ED ADULT NURSE NOTE - NSIMPLEMENTINTERV_GEN_ALL_ED
Implemented All Fall with Harm Risk Interventions:  [place] to call system. Call bell, personal items and telephone within reach. Instruct patient to call for assistance. Room bathroom lighting operational. Non-slip footwear when patient is off stretcher. Physically safe environment: no spills, clutter or unnecessary equipment. Stretcher in lowest position, wheels locked, appropriate side rails in place. Provide visual cue, wrist band, yellow gown, etc. Monitor gait and stability. Monitor for mental status changes and reorient to person, place, and time. Review medications for side effects contributing to fall risk. Reinforce activity limits and safety measures with patient and family. Provide visual clues: red socks.

## 2018-08-13 NOTE — ED ADULT TRIAGE NOTE - CHIEF COMPLAINT QUOTE
p/t with CKD, c/o of swelling to bilateral lower extremities, difficulty urinating and sob sent by pmd for eval, p/t denies any chest pain

## 2018-08-13 NOTE — ED PROVIDER NOTE - OBJECTIVE STATEMENT
89y female w/ PMH of HLD, HTN, PVD presenting because she has not urinated since yesterday.  She last urinated last night.  She denies pain and has only had the urge to urinate since she has been in the ED.  She has also had chills since yesterday. Denies history of urinary retention.  She saw her PCP today and was told that she had a UTI (a urine sample from her last urination that her daughter saved was used). 89y female w/ PMH of HLD, HTN, PVD presenting because she has not urinated since yesterday.  She last urinated last night.  She denies pain and has only had the urge to urinate since she has been in the ED.  She has also had chills since yesterday. Denies history of urinary retention.  She saw her PCP today and was told that she had a UTI (a urine sample from her last urination that her daughter saved was used).  Primary physician - Sarath Shelton 89y female w/ PMH of HLD, HTN, PVD presenting because she has not urinated since yesterday.  She last urinated last night.  She denies pain and has only had the urge to urinate since she has been in the ED.  She has also had chills since yesterday. Denies history of urinary retention.  She saw her PCP today and was told that she had a UTI (a urine sample from her last urination that her daughter saved was used).  Primary physician - Sarath Shelton.

## 2018-08-13 NOTE — ED ADULT NURSE NOTE - PMH
Hiatal hernia    HLD (hyperlipidemia)    HTN (hypertension)    Pulmonary emboli  20 years ago, n AC currently  PVD (peripheral vascular disease)

## 2018-08-14 DIAGNOSIS — K44.9 DIAPHRAGMATIC HERNIA WITHOUT OBSTRUCTION OR GANGRENE: ICD-10-CM

## 2018-08-14 DIAGNOSIS — I73.9 PERIPHERAL VASCULAR DISEASE, UNSPECIFIED: ICD-10-CM

## 2018-08-14 DIAGNOSIS — I48.91 UNSPECIFIED ATRIAL FIBRILLATION: ICD-10-CM

## 2018-08-14 DIAGNOSIS — N18.3 CHRONIC KIDNEY DISEASE, STAGE 3 (MODERATE): ICD-10-CM

## 2018-08-14 DIAGNOSIS — Z29.9 ENCOUNTER FOR PROPHYLACTIC MEASURES, UNSPECIFIED: ICD-10-CM

## 2018-08-14 DIAGNOSIS — N39.0 URINARY TRACT INFECTION, SITE NOT SPECIFIED: ICD-10-CM

## 2018-08-14 DIAGNOSIS — I10 ESSENTIAL (PRIMARY) HYPERTENSION: ICD-10-CM

## 2018-08-14 DIAGNOSIS — E78.5 HYPERLIPIDEMIA, UNSPECIFIED: ICD-10-CM

## 2018-08-14 DIAGNOSIS — N85.00 ENDOMETRIAL HYPERPLASIA, UNSPECIFIED: ICD-10-CM

## 2018-08-14 DIAGNOSIS — R10.30 LOWER ABDOMINAL PAIN, UNSPECIFIED: ICD-10-CM

## 2018-08-14 LAB
ALBUMIN SERPL ELPH-MCNC: 3 G/DL — LOW (ref 3.3–5)
ALP SERPL-CCNC: 105 U/L — SIGNIFICANT CHANGE UP (ref 40–120)
ALT FLD-CCNC: 21 U/L — SIGNIFICANT CHANGE UP (ref 4–33)
APTT BLD: 28.5 SEC — SIGNIFICANT CHANGE UP (ref 27.5–37.4)
AST SERPL-CCNC: 23 U/L — SIGNIFICANT CHANGE UP (ref 4–32)
BASOPHILS # BLD AUTO: 0.03 K/UL — SIGNIFICANT CHANGE UP (ref 0–0.2)
BASOPHILS NFR BLD AUTO: 0.2 % — SIGNIFICANT CHANGE UP (ref 0–2)
BILIRUB SERPL-MCNC: 0.4 MG/DL — SIGNIFICANT CHANGE UP (ref 0.2–1.2)
BUN SERPL-MCNC: 42 MG/DL — HIGH (ref 7–23)
CALCIUM SERPL-MCNC: 8.5 MG/DL — SIGNIFICANT CHANGE UP (ref 8.4–10.5)
CHLORIDE SERPL-SCNC: 100 MMOL/L — SIGNIFICANT CHANGE UP (ref 98–107)
CHOLEST SERPL-MCNC: 98 MG/DL — LOW (ref 120–199)
CO2 SERPL-SCNC: 23 MMOL/L — SIGNIFICANT CHANGE UP (ref 22–31)
CREAT SERPL-MCNC: 2.04 MG/DL — HIGH (ref 0.5–1.3)
EOSINOPHIL # BLD AUTO: 0.1 K/UL — SIGNIFICANT CHANGE UP (ref 0–0.5)
EOSINOPHIL NFR BLD AUTO: 0.6 % — SIGNIFICANT CHANGE UP (ref 0–6)
FERRITIN SERPL-MCNC: 130 NG/ML — SIGNIFICANT CHANGE UP (ref 15–150)
FOLATE SERPL-MCNC: 10.2 NG/ML — SIGNIFICANT CHANGE UP (ref 4.7–20)
GLUCOSE SERPL-MCNC: 120 MG/DL — HIGH (ref 70–99)
HAV IGM SER-ACNC: NONREACTIVE — SIGNIFICANT CHANGE UP
HBA1C BLD-MCNC: 5.5 % — SIGNIFICANT CHANGE UP (ref 4–5.6)
HBV CORE IGM SER-ACNC: NONREACTIVE — SIGNIFICANT CHANGE UP
HBV SURFACE AG SER-ACNC: NONREACTIVE — SIGNIFICANT CHANGE UP
HCT VFR BLD CALC: 34.9 % — SIGNIFICANT CHANGE UP (ref 34.5–45)
HCV AB S/CO SERPL IA: 0.4 S/CO — SIGNIFICANT CHANGE UP
HCV AB SERPL-IMP: SIGNIFICANT CHANGE UP
HDLC SERPL-MCNC: 23 MG/DL — LOW (ref 45–65)
HGB BLD-MCNC: 11.1 G/DL — LOW (ref 11.5–15.5)
IMM GRANULOCYTES # BLD AUTO: 0.17 # — SIGNIFICANT CHANGE UP
IMM GRANULOCYTES NFR BLD AUTO: 1.1 % — SIGNIFICANT CHANGE UP (ref 0–1.5)
INR BLD: 1.11 — SIGNIFICANT CHANGE UP (ref 0.88–1.17)
IRON SATN MFR SERPL: 213 UG/DL — SIGNIFICANT CHANGE UP (ref 140–530)
IRON SATN MFR SERPL: 26 UG/DL — LOW (ref 30–160)
LIPID PNL WITH DIRECT LDL SERPL: 27 MG/DL — SIGNIFICANT CHANGE UP
LYMPHOCYTES # BLD AUTO: 1.94 K/UL — SIGNIFICANT CHANGE UP (ref 1–3.3)
LYMPHOCYTES # BLD AUTO: 12 % — LOW (ref 13–44)
MAGNESIUM SERPL-MCNC: 1.7 MG/DL — SIGNIFICANT CHANGE UP (ref 1.6–2.6)
MCHC RBC-ENTMCNC: 31.2 PG — SIGNIFICANT CHANGE UP (ref 27–34)
MCHC RBC-ENTMCNC: 31.8 % — LOW (ref 32–36)
MCV RBC AUTO: 98 FL — SIGNIFICANT CHANGE UP (ref 80–100)
MONOCYTES # BLD AUTO: 1.48 K/UL — HIGH (ref 0–0.9)
MONOCYTES NFR BLD AUTO: 9.2 % — SIGNIFICANT CHANGE UP (ref 2–14)
NEUTROPHILS # BLD AUTO: 12.42 K/UL — HIGH (ref 1.8–7.4)
NEUTROPHILS NFR BLD AUTO: 76.9 % — SIGNIFICANT CHANGE UP (ref 43–77)
NRBC # FLD: 0 — SIGNIFICANT CHANGE UP
PHOSPHATE SERPL-MCNC: 2.9 MG/DL — SIGNIFICANT CHANGE UP (ref 2.5–4.5)
PLATELET # BLD AUTO: 227 K/UL — SIGNIFICANT CHANGE UP (ref 150–400)
PMV BLD: 10.8 FL — SIGNIFICANT CHANGE UP (ref 7–13)
POTASSIUM SERPL-MCNC: 3.2 MMOL/L — LOW (ref 3.5–5.3)
POTASSIUM SERPL-SCNC: 3.2 MMOL/L — LOW (ref 3.5–5.3)
PROT SERPL-MCNC: 6 G/DL — SIGNIFICANT CHANGE UP (ref 6–8.3)
PROTHROM AB SERPL-ACNC: 12.8 SEC — SIGNIFICANT CHANGE UP (ref 9.8–13.1)
RBC # BLD: 3.56 M/UL — LOW (ref 3.8–5.2)
RBC # FLD: 14.7 % — HIGH (ref 10.3–14.5)
SODIUM SERPL-SCNC: 138 MMOL/L — SIGNIFICANT CHANGE UP (ref 135–145)
T4 FREE SERPL-MCNC: 1.07 NG/DL — SIGNIFICANT CHANGE UP (ref 0.9–1.8)
TRIGL SERPL-MCNC: 175 MG/DL — HIGH (ref 10–149)
TSH SERPL-MCNC: 2.66 UIU/ML — SIGNIFICANT CHANGE UP (ref 0.27–4.2)
UIBC SERPL-MCNC: 187.1 UG/DL — SIGNIFICANT CHANGE UP (ref 110–370)
VIT B12 SERPL-MCNC: 1228 PG/ML — HIGH (ref 200–900)
WBC # BLD: 16.14 K/UL — HIGH (ref 3.8–10.5)
WBC # FLD AUTO: 16.14 K/UL — HIGH (ref 3.8–10.5)

## 2018-08-14 PROCEDURE — 76770 US EXAM ABDO BACK WALL COMP: CPT | Mod: 26

## 2018-08-14 PROCEDURE — 12345: CPT

## 2018-08-14 PROCEDURE — 71045 X-RAY EXAM CHEST 1 VIEW: CPT | Mod: 26

## 2018-08-14 PROCEDURE — 99223 1ST HOSP IP/OBS HIGH 75: CPT

## 2018-08-14 PROCEDURE — 76856 US EXAM PELVIC COMPLETE: CPT | Mod: 26

## 2018-08-14 RX ORDER — ACETAMINOPHEN 500 MG
650 TABLET ORAL EVERY 8 HOURS
Qty: 0 | Refills: 0 | Status: DISCONTINUED | OUTPATIENT
Start: 2018-08-14 | End: 2018-08-15

## 2018-08-14 RX ORDER — SIMVASTATIN 20 MG/1
20 TABLET, FILM COATED ORAL AT BEDTIME
Qty: 0 | Refills: 0 | Status: DISCONTINUED | OUTPATIENT
Start: 2018-08-14 | End: 2018-08-15

## 2018-08-14 RX ORDER — GABAPENTIN 400 MG/1
300 CAPSULE ORAL AT BEDTIME
Qty: 0 | Refills: 0 | Status: DISCONTINUED | OUTPATIENT
Start: 2018-08-14 | End: 2018-08-15

## 2018-08-14 RX ORDER — POTASSIUM CHLORIDE 20 MEQ
20 PACKET (EA) ORAL ONCE
Qty: 0 | Refills: 0 | Status: COMPLETED | OUTPATIENT
Start: 2018-08-14 | End: 2018-08-14

## 2018-08-14 RX ORDER — PANTOPRAZOLE SODIUM 20 MG/1
40 TABLET, DELAYED RELEASE ORAL
Qty: 0 | Refills: 0 | Status: DISCONTINUED | OUTPATIENT
Start: 2018-08-14 | End: 2018-08-15

## 2018-08-14 RX ORDER — LACTOBACILLUS ACIDOPHILUS 100MM CELL
1 CAPSULE ORAL EVERY 12 HOURS
Qty: 0 | Refills: 0 | Status: DISCONTINUED | OUTPATIENT
Start: 2018-08-14 | End: 2018-08-15

## 2018-08-14 RX ORDER — GABAPENTIN 400 MG/1
300 CAPSULE ORAL DAILY
Qty: 0 | Refills: 0 | Status: DISCONTINUED | OUTPATIENT
Start: 2018-08-14 | End: 2018-08-15

## 2018-08-14 RX ORDER — METOPROLOL TARTRATE 50 MG
25 TABLET ORAL DAILY
Qty: 0 | Refills: 0 | Status: DISCONTINUED | OUTPATIENT
Start: 2018-08-14 | End: 2018-08-15

## 2018-08-14 RX ORDER — LIDOCAINE 4 G/100G
1 CREAM TOPICAL DAILY
Qty: 0 | Refills: 0 | Status: DISCONTINUED | OUTPATIENT
Start: 2018-08-14 | End: 2018-08-15

## 2018-08-14 RX ORDER — CEFTRIAXONE 500 MG/1
1 INJECTION, POWDER, FOR SOLUTION INTRAMUSCULAR; INTRAVENOUS
Qty: 0 | Refills: 0 | Status: DISCONTINUED | OUTPATIENT
Start: 2018-08-14 | End: 2018-08-15

## 2018-08-14 RX ORDER — HEPARIN SODIUM 5000 [USP'U]/ML
5000 INJECTION INTRAVENOUS; SUBCUTANEOUS EVERY 8 HOURS
Qty: 0 | Refills: 0 | Status: DISCONTINUED | OUTPATIENT
Start: 2018-08-14 | End: 2018-08-15

## 2018-08-14 RX ORDER — ASPIRIN/CALCIUM CARB/MAGNESIUM 324 MG
81 TABLET ORAL DAILY
Qty: 0 | Refills: 0 | Status: DISCONTINUED | OUTPATIENT
Start: 2018-08-14 | End: 2018-08-15

## 2018-08-14 RX ADMIN — PANTOPRAZOLE SODIUM 40 MILLIGRAM(S): 20 TABLET, DELAYED RELEASE ORAL at 06:10

## 2018-08-14 RX ADMIN — Medication 81 MILLIGRAM(S): at 12:56

## 2018-08-14 RX ADMIN — Medication 1 TABLET(S): at 05:32

## 2018-08-14 RX ADMIN — CEFTRIAXONE 100 GRAM(S): 500 INJECTION, POWDER, FOR SOLUTION INTRAMUSCULAR; INTRAVENOUS at 17:55

## 2018-08-14 RX ADMIN — GABAPENTIN 300 MILLIGRAM(S): 400 CAPSULE ORAL at 00:21

## 2018-08-14 RX ADMIN — HEPARIN SODIUM 5000 UNIT(S): 5000 INJECTION INTRAVENOUS; SUBCUTANEOUS at 05:32

## 2018-08-14 RX ADMIN — Medication 650 MILLIGRAM(S): at 01:21

## 2018-08-14 RX ADMIN — SIMVASTATIN 20 MILLIGRAM(S): 20 TABLET, FILM COATED ORAL at 22:31

## 2018-08-14 RX ADMIN — Medication 25 MILLIGRAM(S): at 05:32

## 2018-08-14 RX ADMIN — Medication 1 TABLET(S): at 17:55

## 2018-08-14 RX ADMIN — LIDOCAINE 1 PATCH: 4 CREAM TOPICAL at 12:57

## 2018-08-14 RX ADMIN — Medication 650 MILLIGRAM(S): at 02:00

## 2018-08-14 RX ADMIN — HEPARIN SODIUM 5000 UNIT(S): 5000 INJECTION INTRAVENOUS; SUBCUTANEOUS at 12:56

## 2018-08-14 RX ADMIN — HEPARIN SODIUM 5000 UNIT(S): 5000 INJECTION INTRAVENOUS; SUBCUTANEOUS at 22:31

## 2018-08-14 RX ADMIN — GABAPENTIN 300 MILLIGRAM(S): 400 CAPSULE ORAL at 22:31

## 2018-08-14 RX ADMIN — GABAPENTIN 300 MILLIGRAM(S): 400 CAPSULE ORAL at 12:56

## 2018-08-14 RX ADMIN — Medication 650 MILLIGRAM(S): at 12:36

## 2018-08-14 RX ADMIN — Medication 20 MILLIEQUIVALENT(S): at 12:56

## 2018-08-14 RX ADMIN — Medication 12.5 MILLIGRAM(S): at 00:21

## 2018-08-14 NOTE — H&P ADULT - ASSESSMENT
89 y/o  female w/ PMH of HLD, HTN, PVD, chronic Legs edema, Hard of hearing, A Fib on ASA, Chronic lower abdominal / pelvic pain on Neurontin, , CKD stage  3, HX of remote PE, pt is accompanied with her daughter in the ER tonight, Very poor historian,  presenting because she has not urinated since yesterday.  She last urinated last night.  She  has  urge to urinate since she has been in the ED.  She has also had chills since yesterday, no fever, no N/V, No HA, no Dizziness, no CP, NO SOB, No diarrhea, + dysuria, C/O Lower abdominal pain, no back pain, . Denies history of urinary retention.  She saw her PCP today and was told that she had a UTI (a urine sample from her last urination that her daughter saved was used).  Primary physician - Sarath Shelton, pt was able to urinate in the ER, On tele for Hypokalemia which was replaced by ER, and ECG with A Fib and RVR initially, RVR  resolved , S/P Metoprolol 12.5 mg PO x 1 tonight given by ER, S/P IV Ceftriaxone 1 gm X 1 for UA +, possible UTI, KCL 40 meq po x 1 for K+ 3 given by ER, S/P Gabapentin 300 mg PO x 1 for lower abdominal pain given by ER, pt awake, A+O x 2, as per ER note: ER  Spoke with her primary Dr. Shelton, he stated that she was seen by him today and that she had a fib, which she had not had in years, and that she is not anticoagulated because of her comorbidities.  She also had low blood pressure in his office, a systolic of 90.  She was treated for hyperkalemia a few weeks ago with every other day Kayexalate  and he cautioned for slow repletion for her hypokalemia.  He agreed with admission for her UTI. 89 y/o  female w/ PMH of HLD, HTN, PVD, chronic Legs edema, Hard of hearing, A Fib on ASA, Chronic lower abdominal / pelvic pain on Neurontin, , CKD stage  3, HX of remote PE, pt is accompanied with her daughter in the ER tonight, Very poor historian,  presenting because she has not urinated since yesterday.  She last urinated last night.  She  has  urge to urinate since she has been in the ED.  She has also had chills since yesterday, no fever, no N/V, No HA, no Dizziness, no CP, NO SOB, No diarrhea, + dysuria, C/O Lower abdominal pain, no back pain, . Denies history of urinary retention.  She saw her PCP today and was told that she had a UTI (a urine sample from her last urination that her daughter saved was used).  Primary physician - Sarath Shelton, pt was able to urinate in the ER, On tele for Hypokalemia which was replaced by ER, and ECG with A Fib and RVR initially, RVR  resolved , S/P Metoprolol 12.5 mg PO x 1 tonight given by ER, S/P IV Ceftriaxone 1 gm X 1 for UA +, possible UTI, KCL 40 meq po x 1 for K+ 3 given by ER, S/P Gabapentin 300 mg PO x 1 for lower abdominal pain given by ER, pt awake, A+O x 2, as per ER note: ER  Spoke with her primary Dr. Shelton, he stated that she was seen by him today and that she had a fib, which she had not had in years, and that she is not anticoagulated because of her comorbidities.  She also had low blood pressure in his office, a systolic of 90.  She was treated for hyperkalemia a few weeks ago with every other day Kayexalate  and he cautioned for slow repletion for her hypokalemia.  He agreed with admission for her UTI,  pt uses cane to walk,

## 2018-08-14 NOTE — PROGRESS NOTE ADULT - ASSESSMENT
89 y/o  female w/ PMH of HLD, HTN, PVD, chronic Legs edema, Hard of hearing, A Fib on ASA, Chronic lower abdominal / pelvic pain on Neurontin, , CKD stage  3, HX of remote PE presents with AMS and dysuria found to have acute cystitis

## 2018-08-14 NOTE — PROGRESS NOTE ADULT - SUBJECTIVE AND OBJECTIVE BOX
Patient is a 89y old  Female who presents with a chief complaint of UTI, Lower abdominal pain, Hypokalemia, A fib, (14 Aug 2018 00:05)    SUBJECTIVE / OVERNIGHT EVENTS: Still with dysuria and bladder spasms. No further chills. Mental status at baseline.     MEDICATIONS  (STANDING):  aspirin enteric coated 81 milliGRAM(s) Oral daily  cefTRIAXone   IVPB 1 Gram(s) IV Intermittent <User Schedule>  gabapentin 300 milliGRAM(s) Oral at bedtime  gabapentin 300 milliGRAM(s) Oral daily  heparin  Injectable 5000 Unit(s) SubCutaneous every 8 hours  lactobacillus acidophilus 1 Tablet(s) Oral every 12 hours  lidocaine   Patch 1 Patch Transdermal daily  metoprolol succinate ER 25 milliGRAM(s) Oral daily  pantoprazole    Tablet 40 milliGRAM(s) Oral before breakfast  simvastatin 20 milliGRAM(s) Oral at bedtime    MEDICATIONS  (PRN):  acetaminophen   Tablet. 650 milliGRAM(s) Oral every 8 hours PRN Mod-severe pain    Vital Signs Last 24 Hrs  T(C): 36.5 (14 Aug 2018 13:09), Max: 37.1 (13 Aug 2018 21:58)  T(F): 97.7 (14 Aug 2018 13:09), Max: 98.7 (13 Aug 2018 21:58)  HR: 75 (14 Aug 2018 13:09) (67 - 104)  BP: 146/90 (14 Aug 2018 13:09) (130/77 - 162/93)  BP(mean): --  RR: 18 (14 Aug 2018 13:09) (18 - 22)  SpO2: 95% (14 Aug 2018 13:09) (94% - 98%)      PHYSICAL EXAM:  GENERAL: NAD, well-developed, elderly appearing  HEAD:  Atraumatic, Normocephalic  EYES: EOMI, PERRLA, conjunctiva and sclera clear  NECK: Supple, No JVD  CHEST/LUNG: Clear to auscultation bilaterally; No wheeze  HEART: Regular rate and rhythm; No murmurs, rubs, or gallops  ABDOMEN: Soft, Nontender, Nondistended; Bowel sounds present  EXTREMITIES:  2+ Peripheral Pulses, No clubbing, cyanosis, or edema  PSYCH: AAOx3  NEUROLOGY: non-focal  SKIN: No rashes or lesions    LABS:                        11.1   16.14 )-----------( 227      ( 14 Aug 2018 06:39 )             34.9     08-14    138  |  100  |  42<H>  ----------------------------<  120<H>  3.2<L>   |  23  |  2.04<H>    Ca    8.5      14 Aug 2018 06:39  Phos  2.9     -14  Mg     1.7     -    TPro  6.0  /  Alb  3.0<L>  /  TBili  0.4  /  DBili  x   /  AST  23  /  ALT  21  /  AlkPhos  105  08-14    PT/INR - ( 14 Aug 2018 06:39 )   PT: 12.8 SEC;   INR: 1.11          PTT - ( 14 Aug 2018 06:39 )  PTT:28.5 SEC      Urinalysis Basic - ( 13 Aug 2018 17:16 )    Color: YELLOW / Appearance: TURBID / S.024 / pH: 6.0  Gluc: NEGATIVE / Ketone: NEGATIVE  / Bili: SMALL / Urobili: 2 mg/dL   Blood: MODERATE / Protein: 500 mg/dL / Nitrite: NEGATIVE   Leuk Esterase: LARGE / RBC: 5-10 / WBC >50   Sq Epi: MOD / Non Sq Epi: x / Bacteria: MOD        RADIOLOGY & ADDITIONAL TESTS:    Imaging Personally Reviewed:    Consultant(s) Notes Reviewed:      Care Discussed with Consultants/Other Providers:    Assessment and Plan:

## 2018-08-14 NOTE — H&P ADULT - HISTORY OF PRESENT ILLNESS
89 y/o  female w/ PMH of HLD, HTN, PVD, chronic Legs edema, Hard of hearing, A Fib on ASA, Chronic lower abdominal / pelvic pain on Neurontin, , CKD stage  3, HX of remote PE, pt is accompanied with her daughter in the ER tonight, Very poor historian,  presenting because she has not urinated since yesterday.  She last urinated last night.  She  has  urge to urinate since she has been in the ED.  She has also had chills since yesterday, no fever, no N/V, No HA, no Dizziness, no CP, NO SOB, No diarrhea, + dysuria, C/O Lower abdominal pain, no back pain, . Denies history of urinary retention.  She saw her PCP today and was told that she had a UTI (a urine sample from her last urination that her daughter saved was used).  Primary physician - Sarath Shelton, pt was able to urinate in the ER, On tele for Hypokalemia which was replaced by ER, and ECG with A Fib and RVR initially, RVR  resolved , S/P Metoprolol 12.5 mg PO x 1 tonight given by ER, S/P IV Ceftriaxone 1 gm X 1 for UA +, possible UTI, KCL 40 meq po x 1 for K+ 3 given by ER, S/P Gabapentin 300 mg PO x 1 for lower abdominal pain given by ER, pt awake, A+O x 2, as per ER note: ER  Spoke with her primary Dr. Shelton, he stated that she was seen by him today and that she had a fib, which she had not had in years, and that she is not anticoagulated because of her comorbidities.  She also had low blood pressure in his office, a systolic of 90.  She was treated for hyperkalemia a few weeks ago with every other day Kayexalate  and he cautioned for slow repletion for her hypokalemia.  He agreed with admission for her UTI. 89 y/o  female w/ PMH of HLD, HTN, PVD, chronic Legs edema, Hard of hearing, A Fib on ASA, Chronic lower abdominal / pelvic pain on Neurontin, , CKD stage  3, HX of remote PE, pt is accompanied with her daughter in the ER tonight, Very poor historian,  presenting because she has not urinated since yesterday.  She last urinated last night.  She  has  urge to urinate since she has been in the ED.  She has also had chills since yesterday, no fever, no N/V, No HA, no Dizziness, no CP, NO SOB, No diarrhea, + dysuria, C/O Lower abdominal pain, no back pain, . Denies history of urinary retention.  She saw her PCP today and was told that she had a UTI (a urine sample from her last urination that her daughter saved was used).  Primary physician - Sarath Shelton, pt was able to urinate in the ER, On tele for Hypokalemia which was replaced by ER, and ECG with A Fib and RVR initially, RVR  resolved , S/P Metoprolol 12.5 mg PO x 1 tonight given by ER, S/P IV Ceftriaxone 1 gm X 1 for UA +, possible UTI, KCL 40 meq po x 1 for K+ 3 given by ER, S/P Gabapentin 300 mg PO x 1 for lower abdominal pain given by ER, pt awake, A+O x 2, as per ER note: ER  Spoke with her primary Dr. Shelton, he stated that she was seen by him today and that she had a fib, which she had not had in years, and that she is not anticoagulated because of her comorbidities.  She also had low blood pressure in his office, a systolic of 90.  She was treated for hyperkalemia a few weeks ago with every other day Kayexalate  and he cautioned for slow repletion for her hypokalemia.  He agreed with admission for her UTI,  pt uses cane to walk,     Labs: UA: Turbid, protein 500, Small Bili, Mod. Blood, Large Leuk Est., WBC > 50, Mod. Bacteria, Na 142, K+ 3, LFT Normal, BUN 44, Creatinine 2.25, Glucose 152, WBC 18.44, Hgb 12.1, platelet 242,     Vitals: Tem 98.7, HR 77, /93, RR 18, 94% RA,

## 2018-08-14 NOTE — PROGRESS NOTE ADULT - PROBLEM SELECTOR PLAN 10
-US with thickened endometrium  -Results discussed with patient and daughter. They understand possibility of endometrial ca. At this time they do not wish to do further tests given patient's age.

## 2018-08-14 NOTE — PROGRESS NOTE ADULT - PROBLEM SELECTOR PLAN 1
-UA consistent with acute cystitis  -Await urine cultures  -C/w ceftriaxone  -Blood culture pending as well.

## 2018-08-14 NOTE — REVIEW OF SYSTEMS
[Fever] : no fever [Chills] : chills [Fatigue] : fatigue [Pain] : no pain [Vision Problems] : no vision problems [Earache] : no earache [Sore Throat] : no sore throat [Chest Pain] : no chest pain [Palpitations] : no palpitations [Leg Claudication] : no leg claudication [Shortness Of Breath] : shortness of breath [Wheezing] : no wheezing [Nausea] : nausea [Diarrhea] : diarrhea [Dysuria] : no dysuria [Incontinence] : incontinence [Nocturia] : nocturia [Joint Pain] : joint pain [Muscle Pain] : muscle pain [Skin Rash] : no skin rash [Suicidal] : not suicidal [Anxiety] : anxiety [Easy Bleeding] : no easy bleeding [Easy Bruising] : no easy bruising

## 2018-08-14 NOTE — H&P ADULT - PROBLEM SELECTOR PLAN 4
on TELE, on ASA, Toprol XL, No Anticoagulation due to comorbid conditions, High risk for Fall,  Chest X ray,

## 2018-08-14 NOTE — ASSESSMENT
[FreeTextEntry1] : ECG: Rapid A-fib.\par \par U/A: WBC, Large\par \par Impression:\par Sepsis with hypotension and personality changes.\par New onset of Atrial Fibrillation.\par \par Long discussion to convince the patient that she needs to be admitted.\par \par Case discussed with Dr. Evangelista from the ED.\par

## 2018-08-14 NOTE — H&P ADULT - PMH
Hiatal hernia    HLD (hyperlipidemia)    HTN (hypertension)    Pulmonary emboli  20 years ago, n AC currently  PVD (peripheral vascular disease) Atrial fibrillation    CKD (chronic kidney disease) stage 3, GFR 30-59 ml/min    Hard of hearing    Hiatal hernia    HLD (hyperlipidemia)    HTN (hypertension)    Lower abdominal pain, unspecified    Pulmonary emboli  20 years ago, n AC currently  PVD (peripheral vascular disease)

## 2018-08-14 NOTE — HISTORY OF PRESENT ILLNESS
[FreeTextEntry8] : CC: Feels terrible\par \par HPI:\par Several days ago she developed fever and chills and difficulty in urinating.  She has become confused according to her daughter.  The patient has no appetite.\par Has not taken her temperature.\par \par Social History:\par She lives alone but there is someone that is available to help her.

## 2018-08-14 NOTE — PHYSICAL THERAPY INITIAL EVALUATION ADULT - PERTINENT HX OF CURRENT PROBLEM, REHAB EVAL
Pt. 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 PMH: CKD, PVD, HLD.

## 2018-08-14 NOTE — H&P ADULT - PROBLEM SELECTOR PLAN 1
+ UA, IV Ceftriaxone, Bacid, F/U Cultures, CBC, CMP,  Fall/aspiration precaution,  + Chills, Leukocytosis,

## 2018-08-14 NOTE — PHYSICAL THERAPY INITIAL EVALUATION ADULT - ADDITIONAL COMMENTS
Pt. report she owns a rolling walker at home. Pt. returned seated in transport chair with all tubes/lines intact, call bell in reach and in NAD.

## 2018-08-15 ENCOUNTER — TRANSCRIPTION ENCOUNTER (OUTPATIENT)
Age: 83
End: 2018-08-15

## 2018-08-15 VITALS
RESPIRATION RATE: 18 BRPM | SYSTOLIC BLOOD PRESSURE: 141 MMHG | TEMPERATURE: 98 F | DIASTOLIC BLOOD PRESSURE: 78 MMHG | HEART RATE: 76 BPM | OXYGEN SATURATION: 94 %

## 2018-08-15 LAB
ALBUMIN SERPL ELPH-MCNC: 3.2 G/DL — LOW (ref 3.3–5)
ALP SERPL-CCNC: 105 U/L — SIGNIFICANT CHANGE UP (ref 40–120)
ALT FLD-CCNC: 24 U/L — SIGNIFICANT CHANGE UP (ref 4–33)
AST SERPL-CCNC: 26 U/L — SIGNIFICANT CHANGE UP (ref 4–32)
BASOPHILS # BLD AUTO: 0.04 K/UL — SIGNIFICANT CHANGE UP (ref 0–0.2)
BASOPHILS NFR BLD AUTO: 0.3 % — SIGNIFICANT CHANGE UP (ref 0–2)
BILIRUB SERPL-MCNC: 0.4 MG/DL — SIGNIFICANT CHANGE UP (ref 0.2–1.2)
BUN SERPL-MCNC: 42 MG/DL — HIGH (ref 7–23)
CALCIUM SERPL-MCNC: 8.8 MG/DL — SIGNIFICANT CHANGE UP (ref 8.4–10.5)
CHLORIDE SERPL-SCNC: 100 MMOL/L — SIGNIFICANT CHANGE UP (ref 98–107)
CO2 SERPL-SCNC: 24 MMOL/L — SIGNIFICANT CHANGE UP (ref 22–31)
CREAT SERPL-MCNC: 1.95 MG/DL — HIGH (ref 0.5–1.3)
EOSINOPHIL # BLD AUTO: 0.16 K/UL — SIGNIFICANT CHANGE UP (ref 0–0.5)
EOSINOPHIL NFR BLD AUTO: 1.2 % — SIGNIFICANT CHANGE UP (ref 0–6)
GLUCOSE SERPL-MCNC: 108 MG/DL — HIGH (ref 70–99)
HCT VFR BLD CALC: 35.4 % — SIGNIFICANT CHANGE UP (ref 34.5–45)
HGB BLD-MCNC: 11.1 G/DL — LOW (ref 11.5–15.5)
IMM GRANULOCYTES # BLD AUTO: 0.21 # — SIGNIFICANT CHANGE UP
IMM GRANULOCYTES NFR BLD AUTO: 1.5 % — SIGNIFICANT CHANGE UP (ref 0–1.5)
LYMPHOCYTES # BLD AUTO: 15 % — SIGNIFICANT CHANGE UP (ref 13–44)
LYMPHOCYTES # BLD AUTO: 2.07 K/UL — SIGNIFICANT CHANGE UP (ref 1–3.3)
MAGNESIUM SERPL-MCNC: 1.8 MG/DL — SIGNIFICANT CHANGE UP (ref 1.6–2.6)
MCHC RBC-ENTMCNC: 30.8 PG — SIGNIFICANT CHANGE UP (ref 27–34)
MCHC RBC-ENTMCNC: 31.4 % — LOW (ref 32–36)
MCV RBC AUTO: 98.3 FL — SIGNIFICANT CHANGE UP (ref 80–100)
MONOCYTES # BLD AUTO: 1.17 K/UL — HIGH (ref 0–0.9)
MONOCYTES NFR BLD AUTO: 8.5 % — SIGNIFICANT CHANGE UP (ref 2–14)
NEUTROPHILS # BLD AUTO: 10.11 K/UL — HIGH (ref 1.8–7.4)
NEUTROPHILS NFR BLD AUTO: 73.5 % — SIGNIFICANT CHANGE UP (ref 43–77)
NRBC # FLD: 0 — SIGNIFICANT CHANGE UP
PHOSPHATE SERPL-MCNC: 2.6 MG/DL — SIGNIFICANT CHANGE UP (ref 2.5–4.5)
PLATELET # BLD AUTO: 248 K/UL — SIGNIFICANT CHANGE UP (ref 150–400)
PMV BLD: 11 FL — SIGNIFICANT CHANGE UP (ref 7–13)
POTASSIUM SERPL-MCNC: 3.4 MMOL/L — LOW (ref 3.5–5.3)
POTASSIUM SERPL-SCNC: 3.4 MMOL/L — LOW (ref 3.5–5.3)
PROT SERPL-MCNC: 6.2 G/DL — SIGNIFICANT CHANGE UP (ref 6–8.3)
RBC # BLD: 3.6 M/UL — LOW (ref 3.8–5.2)
RBC # FLD: 14.9 % — HIGH (ref 10.3–14.5)
SODIUM SERPL-SCNC: 142 MMOL/L — SIGNIFICANT CHANGE UP (ref 135–145)
SPECIMEN SOURCE: SIGNIFICANT CHANGE UP
WBC # BLD: 13.76 K/UL — HIGH (ref 3.8–10.5)
WBC # FLD AUTO: 13.76 K/UL — HIGH (ref 3.8–10.5)

## 2018-08-15 PROCEDURE — 99239 HOSP IP/OBS DSCHRG MGMT >30: CPT

## 2018-08-15 RX ORDER — CEFPODOXIME PROXETIL 100 MG
1 TABLET ORAL
Qty: 4 | Refills: 0
Start: 2018-08-15 | End: 2018-08-18

## 2018-08-15 RX ORDER — POTASSIUM CHLORIDE 20 MEQ
20 PACKET (EA) ORAL ONCE
Qty: 0 | Refills: 0 | Status: COMPLETED | OUTPATIENT
Start: 2018-08-15 | End: 2018-08-15

## 2018-08-15 RX ORDER — GABAPENTIN 400 MG/1
300 CAPSULE ORAL ONCE
Qty: 0 | Refills: 0 | Status: COMPLETED | OUTPATIENT
Start: 2018-08-15 | End: 2018-08-15

## 2018-08-15 RX ADMIN — Medication 650 MILLIGRAM(S): at 11:45

## 2018-08-15 RX ADMIN — HEPARIN SODIUM 5000 UNIT(S): 5000 INJECTION INTRAVENOUS; SUBCUTANEOUS at 05:50

## 2018-08-15 RX ADMIN — Medication 20 MILLIEQUIVALENT(S): at 13:06

## 2018-08-15 RX ADMIN — LIDOCAINE 1 PATCH: 4 CREAM TOPICAL at 00:01

## 2018-08-15 RX ADMIN — CEFTRIAXONE 100 GRAM(S): 500 INJECTION, POWDER, FOR SOLUTION INTRAMUSCULAR; INTRAVENOUS at 15:31

## 2018-08-15 RX ADMIN — Medication 650 MILLIGRAM(S): at 10:56

## 2018-08-15 RX ADMIN — LIDOCAINE 1 PATCH: 4 CREAM TOPICAL at 13:07

## 2018-08-15 RX ADMIN — GABAPENTIN 300 MILLIGRAM(S): 400 CAPSULE ORAL at 13:06

## 2018-08-15 RX ADMIN — HEPARIN SODIUM 5000 UNIT(S): 5000 INJECTION INTRAVENOUS; SUBCUTANEOUS at 13:07

## 2018-08-15 RX ADMIN — Medication 25 MILLIGRAM(S): at 05:45

## 2018-08-15 RX ADMIN — Medication 81 MILLIGRAM(S): at 13:06

## 2018-08-15 RX ADMIN — PANTOPRAZOLE SODIUM 40 MILLIGRAM(S): 20 TABLET, DELAYED RELEASE ORAL at 05:47

## 2018-08-15 NOTE — DISCHARGE NOTE ADULT - ADDITIONAL INSTRUCTIONS
Make sure to continue taking your antibiotic as prescribed.  Dr. Valderrama will contact you if we need to change your antibiotic.  Return to the Hospital Emergency Department for any worsening symptoms or concerns, fevers temperature of 101.0F or higher, chills, back or flank pain, vomiting, abdominal pain, inability tolerating your antibiotic.  Please follow up with your own private physician or our medical clinic at [phone] with in one week.   Find a doctor at [phone].  Thank you for allowing us to take part in your care.

## 2018-08-15 NOTE — DISCHARGE NOTE ADULT - HOSPITAL COURSE
89 y/o  female w/ PMH of HLD, HTN, PVD, chronic Legs edema, Hard of hearing, A Fib on ASA, Chronic lower abdominal / pelvic pain on Neurontin, , CKD stage  3, HX of remote PE, pt is accompanied with her daughter in the ER tonight, Very poor historian,  presenting because she has not urinated since yesterday. Admit to telemetry for a. fib with RVR and hypokalemia. Patient was found to have UTI. She was treated with 3 days of IV ceftriaxone. Patient wanted to leave prior to urine sensitivities. After discussion with patient and family, patient was discharged home on PO vantin. Prescriber to follow up with urine culture after discharge and notify patient if she needs to return to hospital in case of MDR organisms. Overall patient is feeling better, leukocytosis is improving. Stable for DC home. She will follow up with PCP next week.

## 2018-08-15 NOTE — PROGRESS NOTE ADULT - SUBJECTIVE AND OBJECTIVE BOX
Patient is a 89y old  Female who presents with a chief complaint of UTI, Lower abdominal pain, Hypokalemia, A fib, (14 Aug 2018 00:05)    SUBJECTIVE / OVERNIGHT EVENTS: Still with bladder spasms which is chronic for many years. No further chills. Mental status at baseline. Daughters at bedside and would like to take patient home. Patient also would like to be discharged as she overall feels at her baseline functional state.     MEDICATIONS  (STANDING):  aspirin enteric coated 81 milliGRAM(s) Oral daily  cefTRIAXone   IVPB 1 Gram(s) IV Intermittent <User Schedule>  gabapentin 300 milliGRAM(s) Oral at bedtime  gabapentin 300 milliGRAM(s) Oral daily  heparin  Injectable 5000 Unit(s) SubCutaneous every 8 hours  lactobacillus acidophilus 1 Tablet(s) Oral every 12 hours  lidocaine   Patch 1 Patch Transdermal daily  metoprolol succinate ER 25 milliGRAM(s) Oral daily  pantoprazole    Tablet 40 milliGRAM(s) Oral before breakfast  simvastatin 20 milliGRAM(s) Oral at bedtime    MEDICATIONS  (PRN):  acetaminophen   Tablet. 650 milliGRAM(s) Oral every 8 hours PRN Mod-severe pain    Vital Signs Last 24 Hrs  T(C): 36.6 (15 Aug 2018 13:02), Max: 36.8 (14 Aug 2018 20:35)  T(F): 97.8 (15 Aug 2018 13:02), Max: 98.2 (14 Aug 2018 20:35)  HR: 76 (15 Aug 2018 13:02) (62 - 87)  BP: 141/78 (15 Aug 2018 13:02) (131/67 - 184/95)  BP(mean): --  RR: 18 (15 Aug 2018 13:02) (17 - 18)  SpO2: 94% (15 Aug 2018 13:02) (94% - 96%)    PHYSICAL EXAM:  GENERAL: NAD, well-developed, elderly appearing  HEAD:  Atraumatic, Normocephalic  EYES: EOMI, PERRLA, conjunctiva and sclera clear  NECK: Supple, No JVD  CHEST/LUNG: Clear to auscultation bilaterally; No wheeze  HEART: Regular rate and rhythm; No murmurs, rubs, or gallops  ABDOMEN: Soft, Nontender, Nondistended; Bowel sounds present  EXTREMITIES:  +edema b/l, ecchymosis noted on LE  PSYCH: AAOx3  NEUROLOGY: non-focal  SKIN: No rashes or lesions    LABS:                                   11.1   13.76 )-----------( 248      ( 15 Aug 2018 07:11 )             35.4              08-15    142  |  100  |  42<H>  ----------------------------<  108<H>  3.4<L>   |  24  |  1.95<H>    Ca    8.8      15 Aug 2018 07:11  Phos  2.6     08-15  Mg     1.8     08-15    TPro  6.2  /  Alb  3.2<L>  /  TBili  0.4  /  DBili  x   /  AST  26  /  ALT  24  /  AlkPhos  105  08-15      Imaging Personally Reviewed:    Consultant(s) Notes Reviewed:      Care Discussed with Consultants/Other Providers:    Assessment and Plan:

## 2018-08-15 NOTE — PROGRESS NOTE ADULT - PROBLEM SELECTOR PLAN 1
-UA consistent with acute cystitis  -Urine culture with ecoli however sensitivities are still pending. Per lab, sensitivities will be not result until tomorrow.   -C/w ceftriaxone. Today is day 3/7. Transition to PO vantin 200daily per renal clearance for 4 more days. To finish total of 7 day course of antibiotic.   -Clinically patient is improving, leukocytosis improving as well. Do not suspect MDR organism. Will follow up culture tomorrow after patient's discharge per her request.   -Blood cultures negative for 24 hours as well.  -After discussion with patient and family, this pleasant 89 y/o female would benefit with short hospital stay. Family is in agreement to finish 3 day course of iv ceftriaxone inpt and transition to PO to finish course. They understand that they may need to return to hospital if culture comes back to be resistant bacteria.

## 2018-08-15 NOTE — DISCHARGE NOTE ADULT - PLAN OF CARE
Resolving You have received 3 days of intravenous antibiotics. You need to finish 4 more days of oral antibiotics to finish total of 7 day course. Please take antibiotics as prescribed. Please follow up with your PCP as outpt. Chronic bladder spasms Continue to take your home dose of neurontin. Please follow up with urologist to see if alternative agent can be prescribed.

## 2018-08-15 NOTE — DISCHARGE NOTE ADULT - PATIENT PORTAL LINK FT
You can access the [facility] Patient Portal, offered by [facility], by registering with the following website: [email or web address]

## 2018-08-15 NOTE — DISCHARGE NOTE ADULT - MEDICATION SUMMARY - MEDICATIONS TO TAKE
I will START or STAY ON the medications listed below when I get home from the hospital:    rolling walker  -- Indication: For walking    acetaminophen 325 mg oral tablet  -- 2 tab(s) by mouth every 6 hours, As needed, Mild Pain  -- Indication: For Pain    traMADol 50 mg oral tablet  -- 2 tab(s) by mouth once a day, As Needed    ISTOP Reference#[id number]  RxDispensed: 6/20/2018  Quantity: 60  Day Supply: 30  -- Indication: For Pain    aspirin 81 mg oral delayed release tablet  -- 1 tab(s) by mouth once a day  -- Indication: For for heart    gabapentin 300 mg oral capsule  -- 2 cap(s) by mouth 3 times a day  -- Indication: For bladder spasms    Zocor 20 mg oral tablet  -- 1 tab(s) by mouth once a day (at bedtime)  -- Indication: For Cholesterol    Toprol-XL 25 mg oral tablet, extended release  -- 1 tab(s) by mouth once a day  -- Indication: For Hypertension    cefpodoxime 200 mg oral tablet  -- 1 tab(s) by mouth once a day MDD:1 tablet  Spectra- [id number]- Call me if any issurance issues.    -- Finish all this medication unless otherwise directed by prescriber.  Take with food or milk.    -- Indication: For UTI (urinary tract infection)    omeprazole 20 mg oral delayed release tablet  -- 1 tab(s) by mouth once a day  -- Indication: For gerd

## 2018-08-15 NOTE — DISCHARGE NOTE ADULT - CARE PLAN
Principal Discharge DX:	UTI (urinary tract infection)  Goal:	Resolving  Assessment and plan of treatment:	You have received 3 days of intravenous antibiotics. You need to finish 4 more days of oral antibiotics to finish total of 7 day course. Please take antibiotics as prescribed. Please follow up with your PCP as outpt.  Secondary Diagnosis:	Lower abdominal pain, unspecified  Goal:	Chronic bladder spasms  Assessment and plan of treatment:	Continue to take your home dose of neurontin. Please follow up with urologist to see if alternative agent can be prescribed.

## 2018-08-16 LAB
BACTERIA UR CULT: ABNORMAL
METHOD TYPE: SIGNIFICANT CHANGE UP
ORGANISM # SPEC MICROSCOPIC CNT: SIGNIFICANT CHANGE UP
ORGANISM # SPEC MICROSCOPIC CNT: SIGNIFICANT CHANGE UP

## 2018-08-17 PROBLEM — N18.3 CHRONIC KIDNEY DISEASE, STAGE 3 (MODERATE): Chronic | Status: ACTIVE | Noted: 2018-08-14

## 2018-08-17 PROBLEM — I48.91 UNSPECIFIED ATRIAL FIBRILLATION: Chronic | Status: ACTIVE | Noted: 2018-08-14

## 2018-08-17 PROBLEM — R10.30 LOWER ABDOMINAL PAIN, UNSPECIFIED: Chronic | Status: ACTIVE | Noted: 2018-08-14

## 2018-08-17 PROBLEM — H91.90 UNSPECIFIED HEARING LOSS, UNSPECIFIED EAR: Chronic | Status: ACTIVE | Noted: 2018-08-14

## 2018-08-19 LAB
BACTERIA BLD CULT: SIGNIFICANT CHANGE UP
BACTERIA BLD CULT: SIGNIFICANT CHANGE UP

## 2018-08-20 PROBLEM — L89.312 DECUBITUS ULCER OF RIGHT BUTTOCK, STAGE 2: Status: RESOLVED | Noted: 2018-02-06 | Resolved: 2018-08-20

## 2018-08-20 LAB
-  AMIKACIN: SIGNIFICANT CHANGE UP
-  AMPICILLIN/SULBACTAM: SIGNIFICANT CHANGE UP
-  AMPICILLIN: SIGNIFICANT CHANGE UP
-  AZTREONAM: SIGNIFICANT CHANGE UP
-  CEFAZOLIN: SIGNIFICANT CHANGE UP
-  CEFEPIME: SIGNIFICANT CHANGE UP
-  CEFOXITIN: SIGNIFICANT CHANGE UP
-  CEFTAZIDIME: SIGNIFICANT CHANGE UP
-  CEFTRIAXONE: SIGNIFICANT CHANGE UP
-  CIPROFLOXACIN: SIGNIFICANT CHANGE UP
-  ERTAPENEM: SIGNIFICANT CHANGE UP
-  GENTAMICIN: SIGNIFICANT CHANGE UP
-  IMIPENEM: SIGNIFICANT CHANGE UP
-  LEVOFLOXACIN: SIGNIFICANT CHANGE UP
-  MEROPENEM: SIGNIFICANT CHANGE UP
-  NITROFURANTOIN: SIGNIFICANT CHANGE UP
-  PIPERACILLIN/TAZOBACTAM: SIGNIFICANT CHANGE UP
-  TIGECYCLINE: SIGNIFICANT CHANGE UP
-  TOBRAMYCIN: SIGNIFICANT CHANGE UP
-  TRIMETHOPRIM/SULFAMETHOXAZOLE: SIGNIFICANT CHANGE UP
BACTERIA UR CULT: SIGNIFICANT CHANGE UP
ORGANISM # SPEC MICROSCOPIC CNT: SIGNIFICANT CHANGE UP
ORGANISM # SPEC MICROSCOPIC CNT: SIGNIFICANT CHANGE UP

## 2018-08-23 ENCOUNTER — LABORATORY RESULT (OUTPATIENT)
Age: 83
End: 2018-08-23

## 2018-08-23 ENCOUNTER — APPOINTMENT (OUTPATIENT)
Dept: INTERNAL MEDICINE | Facility: CLINIC | Age: 83
End: 2018-08-23
Payer: MEDICARE

## 2018-08-23 VITALS
HEART RATE: 82 BPM | WEIGHT: 177 LBS | SYSTOLIC BLOOD PRESSURE: 148 MMHG | DIASTOLIC BLOOD PRESSURE: 82 MMHG | BODY MASS INDEX: 34.75 KG/M2 | HEIGHT: 60 IN

## 2018-08-23 VITALS — SYSTOLIC BLOOD PRESSURE: 142 MMHG | DIASTOLIC BLOOD PRESSURE: 80 MMHG

## 2018-08-23 DIAGNOSIS — R06.02 SHORTNESS OF BREATH: ICD-10-CM

## 2018-08-23 PROCEDURE — 36415 COLL VENOUS BLD VENIPUNCTURE: CPT

## 2018-08-23 PROCEDURE — 99495 TRANSJ CARE MGMT MOD F2F 14D: CPT | Mod: 25

## 2018-08-23 RX ORDER — SODIUM POLYSTYRENE SULFONATE 15 G/60ML
15 SUSPENSION ORAL; RECTAL
Qty: 500 | Refills: 1 | Status: DISCONTINUED | COMMUNITY
Start: 2018-07-30 | End: 2018-08-23

## 2018-08-24 ENCOUNTER — OTHER (OUTPATIENT)
Age: 83
End: 2018-08-24

## 2018-08-24 LAB
ALBUMIN SERPL ELPH-MCNC: 3.6 G/DL
ALP BLD-CCNC: 72 U/L
ALT SERPL-CCNC: 10 U/L
ANION GAP SERPL CALC-SCNC: 13 MMOL/L
AST SERPL-CCNC: 19 U/L
BASOPHILS # BLD AUTO: 0.05 K/UL
BASOPHILS NFR BLD AUTO: 0.6 %
BILIRUB SERPL-MCNC: 0.4 MG/DL
BUN SERPL-MCNC: 27 MG/DL
CALCIUM SERPL-MCNC: 9.3 MG/DL
CHLORIDE SERPL-SCNC: 103 MMOL/L
CO2 SERPL-SCNC: 25 MMOL/L
CREAT SERPL-MCNC: 1.6 MG/DL
EOSINOPHIL # BLD AUTO: 0.18 K/UL
EOSINOPHIL NFR BLD AUTO: 2.2 %
GLUCOSE SERPL-MCNC: 92 MG/DL
HCT VFR BLD CALC: 38.3 %
HGB BLD-MCNC: 11.5 G/DL
IMM GRANULOCYTES NFR BLD AUTO: 0.4 %
LYMPHOCYTES # BLD AUTO: 2.18 K/UL
LYMPHOCYTES NFR BLD AUTO: 26.8 %
MAN DIFF?: NORMAL
MCHC RBC-ENTMCNC: 30 GM/DL
MCHC RBC-ENTMCNC: 30.3 PG
MCV RBC AUTO: 101.1 FL
MONOCYTES # BLD AUTO: 0.54 K/UL
MONOCYTES NFR BLD AUTO: 6.6 %
NEUTROPHILS # BLD AUTO: 5.15 K/UL
NEUTROPHILS NFR BLD AUTO: 63.4 %
NT-PROBNP SERPL-MCNC: 8888 PG/ML
PLATELET # BLD AUTO: 425 K/UL
POTASSIUM SERPL-SCNC: 5.1 MMOL/L
PROT SERPL-MCNC: 6.7 G/DL
RBC # BLD: 3.79 M/UL
RBC # FLD: 16.2 %
SODIUM SERPL-SCNC: 141 MMOL/L
WBC # FLD AUTO: 8.13 K/UL

## 2018-08-24 NOTE — REVIEW OF SYSTEMS
[Lower Ext Edema] : lower extremity edema [Shortness Of Breath] : shortness of breath [Negative] : Gastrointestinal [Palpitations] : no palpitations [Wheezing] : no wheezing [Cough] : no cough

## 2018-08-24 NOTE — PHYSICAL EXAM
[No Acute Distress] : no acute distress [Well Nourished] : well nourished [Well Developed] : well developed [Well-Appearing] : well-appearing [No Respiratory Distress] : no respiratory distress  [Clear to Auscultation] : lungs were clear to auscultation bilaterally [Normal Rate] : normal rate  [Regular Rhythm] : with a regular rhythm [___ +] : bilateral [unfilled]U+ pitting edema to the ankles [Soft] : abdomen soft [Non Tender] : non-tender [Non-distended] : non-distended [No Masses] : no abdominal mass palpated [No HSM] : no HSM [Normal Bowel Sounds] : normal bowel sounds [Normal Affect] : the affect was normal [Normal Insight/Judgement] : insight and judgment were intact

## 2018-08-24 NOTE — ASSESSMENT
[FreeTextEntry1] : SOB/EDEMA: pt to go STAT to Dr Murrieta, she will have an echo\par labs drawn \par fu in 1 week\par pt may need lasix\par \par

## 2018-08-24 NOTE — HISTORY OF PRESENT ILLNESS
[de-identified] : 89 year old female here today after being admitted to the hospital for sepsis/UTI last week. She is no longer having urinary issues.  She was discharged and since then the Patient is SOB and has swollen ankles since leaving the hospital. The shortness of breath is very often, not just when she ambulates. She lost 10 pounds. \par She was on Kayexalate in the hospital but hasn’t been on it because her potassium had dropped in the hospital. \par She has a history of PAF. She has not seen a cardiologist and was never diagnosed with heart failure. \par

## 2018-08-28 ENCOUNTER — LABORATORY RESULT (OUTPATIENT)
Age: 83
End: 2018-08-28

## 2018-08-28 ENCOUNTER — APPOINTMENT (OUTPATIENT)
Dept: INTERNAL MEDICINE | Facility: CLINIC | Age: 83
End: 2018-08-28
Payer: MEDICARE

## 2018-08-28 VITALS
DIASTOLIC BLOOD PRESSURE: 82 MMHG | HEART RATE: 73 BPM | BODY MASS INDEX: 36.32 KG/M2 | SYSTOLIC BLOOD PRESSURE: 146 MMHG | HEIGHT: 60 IN | WEIGHT: 185 LBS

## 2018-08-28 PROCEDURE — 36415 COLL VENOUS BLD VENIPUNCTURE: CPT

## 2018-08-28 PROCEDURE — 99214 OFFICE O/P EST MOD 30 MIN: CPT | Mod: 25

## 2018-08-28 NOTE — PHYSICAL EXAM
[No Acute Distress] : no acute distress [Well Nourished] : well nourished [Well Developed] : well developed [Well-Appearing] : well-appearing [No Respiratory Distress] : no respiratory distress  [Clear to Auscultation] : lungs were clear to auscultation bilaterally [Normal Rate] : normal rate  [Regular Rhythm] : with a regular rhythm [___ +] : bilateral [unfilled]U+ pitting edema to the ankles

## 2018-08-28 NOTE — ASSESSMENT
[FreeTextEntry1] : afib: continue Eliquis\par \par Edema: will verify dose of lasix to stay on with pharmacy \par routine bloods\par \par Hyperkalemia: will check potassium \par \par fu in 2 months\par \par fall precautions

## 2018-08-28 NOTE — HISTORY OF PRESENT ILLNESS
[de-identified] : 89 year old female here today for routine follow up of her chronic medical problems.  She recently was in Rapid A fib and fluid overload and saw cardiology. \par \par A FIB: on Eliquis now, no new bruising\par \par Edema: slightly better, on Lasix \par \par Hyperkalemia: will recheck K \par

## 2018-08-29 LAB
ALBUMIN SERPL ELPH-MCNC: 3.8 G/DL
ALP BLD-CCNC: 70 U/L
ALT SERPL-CCNC: 9 U/L
ANION GAP SERPL CALC-SCNC: 15 MMOL/L
AST SERPL-CCNC: 21 U/L
BASOPHILS # BLD AUTO: 0.04 K/UL
BASOPHILS NFR BLD AUTO: 0.5 %
BILIRUB SERPL-MCNC: 0.4 MG/DL
BUN SERPL-MCNC: 27 MG/DL
CALCIUM SERPL-MCNC: 9.4 MG/DL
CHLORIDE SERPL-SCNC: 102 MMOL/L
CO2 SERPL-SCNC: 24 MMOL/L
CREAT SERPL-MCNC: 1.77 MG/DL
EOSINOPHIL # BLD AUTO: 0.24 K/UL
EOSINOPHIL NFR BLD AUTO: 3.2 %
GLUCOSE SERPL-MCNC: 92 MG/DL
HCT VFR BLD CALC: 39.6 %
HGB BLD-MCNC: 11.8 G/DL
IMM GRANULOCYTES NFR BLD AUTO: 0.3 %
LYMPHOCYTES # BLD AUTO: 1.97 K/UL
LYMPHOCYTES NFR BLD AUTO: 26.5 %
MAN DIFF?: NORMAL
MCHC RBC-ENTMCNC: 29.8 GM/DL
MCHC RBC-ENTMCNC: 29.9 PG
MCV RBC AUTO: 100.5 FL
MONOCYTES # BLD AUTO: 0.64 K/UL
MONOCYTES NFR BLD AUTO: 8.6 %
NEUTROPHILS # BLD AUTO: 4.52 K/UL
NEUTROPHILS NFR BLD AUTO: 60.9 %
PLATELET # BLD AUTO: 372 K/UL
POTASSIUM SERPL-SCNC: 4.9 MMOL/L
PROT SERPL-MCNC: 7.1 G/DL
RBC # BLD: 3.94 M/UL
RBC # FLD: 15.6 %
SODIUM SERPL-SCNC: 141 MMOL/L
WBC # FLD AUTO: 7.43 K/UL

## 2018-10-16 ENCOUNTER — APPOINTMENT (OUTPATIENT)
Dept: INTERNAL MEDICINE | Facility: CLINIC | Age: 83
End: 2018-10-16
Payer: MEDICARE

## 2018-10-16 ENCOUNTER — LABORATORY RESULT (OUTPATIENT)
Age: 83
End: 2018-10-16

## 2018-10-16 VITALS
WEIGHT: 182 LBS | SYSTOLIC BLOOD PRESSURE: 119 MMHG | HEIGHT: 60 IN | BODY MASS INDEX: 35.73 KG/M2 | DIASTOLIC BLOOD PRESSURE: 74 MMHG | HEART RATE: 73 BPM

## 2018-10-16 DIAGNOSIS — A41.9 SEPSIS, UNSPECIFIED ORGANISM: ICD-10-CM

## 2018-10-16 DIAGNOSIS — N39.0 SEPSIS, UNSPECIFIED ORGANISM: ICD-10-CM

## 2018-10-16 PROCEDURE — 36415 COLL VENOUS BLD VENIPUNCTURE: CPT

## 2018-10-16 PROCEDURE — 99214 OFFICE O/P EST MOD 30 MIN: CPT | Mod: 25

## 2018-10-16 RX ORDER — APIXABAN 2.5 MG/1
2.5 TABLET, FILM COATED ORAL
Refills: 0 | Status: ACTIVE | COMMUNITY

## 2018-10-16 RX ORDER — TRAMADOL HYDROCHLORIDE 50 MG/1
50 TABLET, COATED ORAL DAILY
Qty: 60 | Refills: 3 | Status: ACTIVE | COMMUNITY
Start: 2017-08-21 | End: 1900-01-01

## 2018-10-16 NOTE — REVIEW OF SYSTEMS
[Fever] : no fever [Night Sweats] : no night sweats [Earache] : no earache [Hearing Loss] : no hearing loss [Nasal Discharge] : no nasal discharge [Sore Throat] : no sore throat [Chest Pain] : no chest pain [Palpitations] : no palpitations [Orthopnea] : no orthopnea [Paroysmal Nocturnal Dyspnea] : no paroysmal nocturnal dyspnea

## 2018-10-16 NOTE — PHYSICAL EXAM
[No Acute Distress] : no acute distress [Well Nourished] : well nourished [No Respiratory Distress] : no respiratory distress  [Clear to Auscultation] : lungs were clear to auscultation bilaterally [No Accessory Muscle Use] : no accessory muscle use [Normal Rate] : normal rate  [Regular Rhythm] : with a regular rhythm [Normal S1, S2] : normal S1 and S2 [Soft] : abdomen soft [Non Tender] : non-tender [No HSM] : no HSM [Normal Bowel Sounds] : normal bowel sounds [No Focal Deficits] : no focal deficits [Speech Grossly Normal] : speech grossly normal [Memory Grossly Normal] : memory grossly normal

## 2018-10-16 NOTE — HISTORY OF PRESENT ILLNESS
[de-identified] : CC: Follow up of Hypertension and other medical problems\par \par HPI:\par This is a 89 year female here for evaluation of:\par \par Hypertension:\par Patient is taking her medication properly and is not having any side effects.\par \par Sciatica:\par Improving.\par She is using less Tramadol.\par \par CKD:\par No symptoms.\par \par PAF:\par No symptoms.

## 2018-10-16 NOTE — ASSESSMENT
[FreeTextEntry1] : Hypertension:\par Well controlled.\par Continue current medication.\par Low salt diet.\par \par Hyperkalemia:\par No longer on Kayexalate.\par \par Morbid Obesity:\par Watch diet.\par \par CKD:\par Get electrolytes.\par

## 2018-10-17 LAB
ALBUMIN SERPL ELPH-MCNC: 4.2 G/DL
ALP BLD-CCNC: 69 U/L
ALT SERPL-CCNC: 8 U/L
ANION GAP SERPL CALC-SCNC: 14 MMOL/L
AST SERPL-CCNC: 15 U/L
BASOPHILS # BLD AUTO: 0.04 K/UL
BASOPHILS NFR BLD AUTO: 0.6 %
BILIRUB SERPL-MCNC: 0.3 MG/DL
BUN SERPL-MCNC: 31 MG/DL
CALCIUM SERPL-MCNC: 9.8 MG/DL
CHLORIDE SERPL-SCNC: 107 MMOL/L
CHOLEST SERPL-MCNC: 166 MG/DL
CHOLEST/HDLC SERPL: 2.5 RATIO
CO2 SERPL-SCNC: 23 MMOL/L
CREAT SERPL-MCNC: 1.78 MG/DL
EOSINOPHIL # BLD AUTO: 0.16 K/UL
EOSINOPHIL NFR BLD AUTO: 2.3 %
GLUCOSE SERPL-MCNC: 114 MG/DL
HCT VFR BLD CALC: 42 %
HDLC SERPL-MCNC: 66 MG/DL
HGB BLD-MCNC: 13.4 G/DL
IMM GRANULOCYTES NFR BLD AUTO: 0.3 %
LDLC SERPL CALC-MCNC: 65 MG/DL
LYMPHOCYTES # BLD AUTO: 2.28 K/UL
LYMPHOCYTES NFR BLD AUTO: 33.5 %
MAN DIFF?: NORMAL
MCHC RBC-ENTMCNC: 31.6 PG
MCHC RBC-ENTMCNC: 31.9 GM/DL
MCV RBC AUTO: 99.1 FL
MONOCYTES # BLD AUTO: 0.44 K/UL
MONOCYTES NFR BLD AUTO: 6.5 %
NEUTROPHILS # BLD AUTO: 3.87 K/UL
NEUTROPHILS NFR BLD AUTO: 56.8 %
PLATELET # BLD AUTO: 246 K/UL
POTASSIUM SERPL-SCNC: 5.5 MMOL/L
PROT SERPL-MCNC: 7 G/DL
RBC # BLD: 4.24 M/UL
RBC # FLD: 15.2 %
SAVE SPECIMEN: NORMAL
SODIUM SERPL-SCNC: 144 MMOL/L
TRIGL SERPL-MCNC: 173 MG/DL
WBC # FLD AUTO: 6.81 K/UL

## 2019-01-08 ENCOUNTER — APPOINTMENT (OUTPATIENT)
Dept: INTERNAL MEDICINE | Facility: CLINIC | Age: 84
End: 2019-01-08
Payer: MEDICARE

## 2019-01-08 VITALS
SYSTOLIC BLOOD PRESSURE: 144 MMHG | BODY MASS INDEX: 33.23 KG/M2 | WEIGHT: 176 LBS | HEART RATE: 86 BPM | HEIGHT: 61 IN | DIASTOLIC BLOOD PRESSURE: 89 MMHG

## 2019-01-08 PROCEDURE — 36415 COLL VENOUS BLD VENIPUNCTURE: CPT

## 2019-01-08 PROCEDURE — 99214 OFFICE O/P EST MOD 30 MIN: CPT | Mod: 25

## 2019-01-08 RX ORDER — SODIUM POLYSTYRENE SULFONATE 4.1 MEQ/G
POWDER, FOR SUSPENSION ORAL; RECTAL DAILY
Refills: 0 | Status: DISCONTINUED | COMMUNITY
Start: 2018-07-30 | End: 2019-01-08

## 2019-01-08 RX ORDER — FUROSEMIDE 20 MG/1
20 TABLET ORAL
Refills: 0 | Status: DISCONTINUED | COMMUNITY
Start: 2018-08-29 | End: 2019-01-08

## 2019-01-09 LAB
ALBUMIN SERPL ELPH-MCNC: 3.9 G/DL
ALP BLD-CCNC: 66 U/L
ALT SERPL-CCNC: 7 U/L
ANION GAP SERPL CALC-SCNC: 13 MMOL/L
AST SERPL-CCNC: 14 U/L
BASOPHILS # BLD AUTO: 0.05 K/UL
BASOPHILS NFR BLD AUTO: 0.6 %
BILIRUB SERPL-MCNC: 0.3 MG/DL
BUN SERPL-MCNC: 39 MG/DL
CALCIUM SERPL-MCNC: 9.6 MG/DL
CHLORIDE SERPL-SCNC: 108 MMOL/L
CHOLEST SERPL-MCNC: 154 MG/DL
CHOLEST/HDLC SERPL: 3 RATIO
CO2 SERPL-SCNC: 19 MMOL/L
CREAT SERPL-MCNC: 2.08 MG/DL
EOSINOPHIL # BLD AUTO: 0.44 K/UL
EOSINOPHIL NFR BLD AUTO: 5.5 %
GLUCOSE SERPL-MCNC: 91 MG/DL
HCT VFR BLD CALC: 45.3 %
HDLC SERPL-MCNC: 51 MG/DL
HGB BLD-MCNC: 13.7 G/DL
IMM GRANULOCYTES NFR BLD AUTO: 0.3 %
LDLC SERPL CALC-MCNC: 66 MG/DL
LYMPHOCYTES # BLD AUTO: 2.31 K/UL
LYMPHOCYTES NFR BLD AUTO: 29 %
MAN DIFF?: NORMAL
MCHC RBC-ENTMCNC: 29.4 PG
MCHC RBC-ENTMCNC: 30.2 GM/DL
MCV RBC AUTO: 97.2 FL
MONOCYTES # BLD AUTO: 0.76 K/UL
MONOCYTES NFR BLD AUTO: 9.5 %
NEUTROPHILS # BLD AUTO: 4.38 K/UL
NEUTROPHILS NFR BLD AUTO: 55.1 %
PLATELET # BLD AUTO: 304 K/UL
POTASSIUM SERPL-SCNC: 5.3 MMOL/L
PROT SERPL-MCNC: 7.3 G/DL
RBC # BLD: 4.66 M/UL
RBC # FLD: 15.6 %
SAVE SPECIMEN: NORMAL
SODIUM SERPL-SCNC: 140 MMOL/L
TRIGL SERPL-MCNC: 184 MG/DL
WBC # FLD AUTO: 7.96 K/UL

## 2019-01-09 NOTE — HISTORY OF PRESENT ILLNESS
[FreeTextEntry8] : CC: Cough\par HPI:\par 1 month\par Usually occurs at night.\par Started after a cold.\par Has lost some weight.\par No SOB.\par Non- productive.\par \par Unexplained weight loss:\par Having dental problems.\par Having problems chewing.\par

## 2019-01-09 NOTE — PHYSICAL EXAM
[No Acute Distress] : no acute distress [Well Nourished] : well nourished [No JVD] : no jugular venous distention [Supple] : supple [No Respiratory Distress] : no respiratory distress  [Clear to Auscultation] : lungs were clear to auscultation bilaterally [Normal Rate] : normal rate  [Regular Rhythm] : with a regular rhythm [Soft] : abdomen soft [Non Tender] : non-tender [Normal Bowel Sounds] : normal bowel sounds [de-identified] : Laceration of left shin with evulsion of the skin.  The area was

## 2019-01-09 NOTE — REVIEW OF SYSTEMS
[Cough] : cough [Fever] : no fever [Chills] : no chills [Night Sweats] : no night sweats [Chest Pain] : no chest pain [Palpitations] : no palpitations [Orthopnea] : no orthopnea [Paroysmal Nocturnal Dyspnea] : no paroysmal nocturnal dyspnea [Wheezing] : no wheezing

## 2019-01-15 ENCOUNTER — APPOINTMENT (OUTPATIENT)
Dept: INTERNAL MEDICINE | Facility: CLINIC | Age: 84
End: 2019-01-15

## 2019-03-05 ENCOUNTER — APPOINTMENT (OUTPATIENT)
Dept: INTERNAL MEDICINE | Facility: CLINIC | Age: 84
End: 2019-03-05
Payer: MEDICARE

## 2019-03-05 VITALS
DIASTOLIC BLOOD PRESSURE: 70 MMHG | WEIGHT: 175 LBS | RESPIRATION RATE: 88 BRPM | HEIGHT: 61 IN | SYSTOLIC BLOOD PRESSURE: 122 MMHG | BODY MASS INDEX: 33.04 KG/M2

## 2019-03-05 DIAGNOSIS — R35.0 FREQUENCY OF MICTURITION: ICD-10-CM

## 2019-03-05 DIAGNOSIS — E87.5 HYPERKALEMIA: ICD-10-CM

## 2019-03-05 LAB
ALBUMIN SERPL ELPH-MCNC: 4 G/DL
ALP BLD-CCNC: 57 U/L
ALT SERPL-CCNC: 11 U/L
ANION GAP SERPL CALC-SCNC: 11 MMOL/L
AST SERPL-CCNC: 16 U/L
BASOPHILS # BLD AUTO: 0.02 K/UL
BASOPHILS NFR BLD AUTO: 0.3 %
BILIRUB SERPL-MCNC: 0.2 MG/DL
BUN SERPL-MCNC: 36 MG/DL
CALCIUM SERPL-MCNC: 9.1 MG/DL
CHLORIDE SERPL-SCNC: 109 MMOL/L
CHOLEST SERPL-MCNC: 131 MG/DL
CHOLEST/HDLC SERPL: 2.3 RATIO
CO2 SERPL-SCNC: 19 MMOL/L
CREAT SERPL-MCNC: 1.82 MG/DL
EOSINOPHIL # BLD AUTO: 0.15 K/UL
EOSINOPHIL NFR BLD AUTO: 2.2 %
GLUCOSE SERPL-MCNC: 98 MG/DL
HCT VFR BLD CALC: 44.9 %
HDLC SERPL-MCNC: 56 MG/DL
HGB BLD-MCNC: 13.3 G/DL
IMM GRANULOCYTES NFR BLD AUTO: 0.3 %
LDLC SERPL CALC-MCNC: 50 MG/DL
LYMPHOCYTES # BLD AUTO: 1.95 K/UL
LYMPHOCYTES NFR BLD AUTO: 28.6 %
MAN DIFF?: NORMAL
MCHC RBC-ENTMCNC: 29.6 GM/DL
MCHC RBC-ENTMCNC: 30 PG
MCV RBC AUTO: 101.1 FL
MONOCYTES # BLD AUTO: 0.64 K/UL
MONOCYTES NFR BLD AUTO: 9.4 %
NEUTROPHILS # BLD AUTO: 4.04 K/UL
NEUTROPHILS NFR BLD AUTO: 59.2 %
PLATELET # BLD AUTO: 213 K/UL
POTASSIUM SERPL-SCNC: 5.2 MMOL/L
PROT SERPL-MCNC: 6.5 G/DL
RBC # BLD: 4.44 M/UL
RBC # FLD: 16.3 %
SAVE SPECIMEN: NORMAL
SODIUM SERPL-SCNC: 139 MMOL/L
TRIGL SERPL-MCNC: 127 MG/DL
WBC # FLD AUTO: 6.82 K/UL

## 2019-03-05 PROCEDURE — 36415 COLL VENOUS BLD VENIPUNCTURE: CPT

## 2019-03-05 PROCEDURE — 99214 OFFICE O/P EST MOD 30 MIN: CPT | Mod: 25

## 2019-03-05 NOTE — PHYSICAL EXAM
[No Acute Distress] : no acute distress [Well Nourished] : well nourished [Well Developed] : well developed [No Respiratory Distress] : no respiratory distress  [Clear to Auscultation] : lungs were clear to auscultation bilaterally

## 2019-03-07 NOTE — REVIEW OF SYSTEMS
[Fever] : no fever [Chills] : no chills [Night Sweats] : no night sweats [Chest Pain] : no chest pain [Palpitations] : no palpitations [Orthopnea] : no orthopnea [Paroysmal Nocturnal Dyspnea] : no paroysmal nocturnal dyspnea [Shortness Of Breath] : no shortness of breath [Wheezing] : no wheezing [Abdominal Pain] : no abdominal pain [Nausea] : no nausea [Heartburn] : no heartburn [Headache] : no headache [Dizziness] : no dizziness [Memory Loss] : no memory loss [Unsteady Walking] : no ataxia [Suicidal] : not suicidal [Insomnia] : no insomnia

## 2019-03-07 NOTE — ASSESSMENT
[FreeTextEntry1] : Hypertension:\par Well controlled.\par Continue current medication.\par Low salt diet.\par \par Hyperkalemia:\par Get K\par \par Morbid Obesity:\par Diet.\par \par PVD:\par Blue feet; no symptoms.

## 2019-03-07 NOTE — HISTORY OF PRESENT ILLNESS
[de-identified] : CC: Follow up of Hypertension and other medical problems\par \par HPI:\par This is a 89 year female here for evaluation of:\par \par Hypertension:\par Patient is taking her medication properly and is not having any side effects.\par \par Frequent urination:\par Having some vague symptoms.\par \par Morbid Obesity:\par not on a diet.

## 2019-04-23 NOTE — ED ADULT NURSE NOTE - IS THE PATIENT ABLE TO BE SCREENED?
Erica states that Dr Ballard believes that part of Meka's issues with her back is vitamin deficiency and is recommending consultation with endocrinologist, Dr Sorto. Will need order from Dr Chadwick in order to schedule appt with Dr Sorto.    Yes

## 2019-06-04 ENCOUNTER — APPOINTMENT (OUTPATIENT)
Dept: INTERNAL MEDICINE | Facility: CLINIC | Age: 84
End: 2019-06-04
Payer: MEDICARE

## 2019-06-04 ENCOUNTER — NON-APPOINTMENT (OUTPATIENT)
Age: 84
End: 2019-06-04

## 2019-06-04 VITALS
BODY MASS INDEX: 32.66 KG/M2 | SYSTOLIC BLOOD PRESSURE: 131 MMHG | WEIGHT: 173 LBS | DIASTOLIC BLOOD PRESSURE: 74 MMHG | HEIGHT: 61 IN | HEART RATE: 98 BPM

## 2019-06-04 DIAGNOSIS — R07.89 OTHER CHEST PAIN: ICD-10-CM

## 2019-06-04 PROCEDURE — 99214 OFFICE O/P EST MOD 30 MIN: CPT | Mod: 25

## 2019-06-04 PROCEDURE — 93000 ELECTROCARDIOGRAM COMPLETE: CPT

## 2019-06-04 PROCEDURE — 36415 COLL VENOUS BLD VENIPUNCTURE: CPT

## 2019-06-05 LAB
ALBUMIN SERPL ELPH-MCNC: 4.1 G/DL
ALP BLD-CCNC: 58 U/L
ALT SERPL-CCNC: 9 U/L
ANION GAP SERPL CALC-SCNC: 11 MMOL/L
AST SERPL-CCNC: 11 U/L
BASOPHILS # BLD AUTO: 0.04 K/UL
BASOPHILS NFR BLD AUTO: 0.6 %
BILIRUB SERPL-MCNC: 0.2 MG/DL
BUN SERPL-MCNC: 33 MG/DL
CALCIUM SERPL-MCNC: 10 MG/DL
CHLORIDE SERPL-SCNC: 110 MMOL/L
CHOLEST SERPL-MCNC: 164 MG/DL
CHOLEST/HDLC SERPL: 3.2 RATIO
CO2 SERPL-SCNC: 21 MMOL/L
CREAT SERPL-MCNC: 1.65 MG/DL
EOSINOPHIL # BLD AUTO: 0.13 K/UL
EOSINOPHIL NFR BLD AUTO: 1.8 %
GLUCOSE SERPL-MCNC: 120 MG/DL
HCT VFR BLD CALC: 43.4 %
HDLC SERPL-MCNC: 52 MG/DL
HGB BLD-MCNC: 12.9 G/DL
IMM GRANULOCYTES NFR BLD AUTO: 0.3 %
LDLC SERPL CALC-MCNC: 73 MG/DL
LYMPHOCYTES # BLD AUTO: 2.33 K/UL
LYMPHOCYTES NFR BLD AUTO: 32.9 %
MAN DIFF?: NORMAL
MCHC RBC-ENTMCNC: 29.7 GM/DL
MCHC RBC-ENTMCNC: 31.2 PG
MCV RBC AUTO: 104.8 FL
MONOCYTES # BLD AUTO: 0.63 K/UL
MONOCYTES NFR BLD AUTO: 8.9 %
NEUTROPHILS # BLD AUTO: 3.93 K/UL
NEUTROPHILS NFR BLD AUTO: 55.5 %
PLATELET # BLD AUTO: 227 K/UL
POTASSIUM SERPL-SCNC: 5 MMOL/L
PROT SERPL-MCNC: 6.5 G/DL
RBC # BLD: 4.14 M/UL
RBC # FLD: 14.6 %
SAVE SPECIMEN: NORMAL
SODIUM SERPL-SCNC: 142 MMOL/L
TRIGL SERPL-MCNC: 195 MG/DL
WBC # FLD AUTO: 7.08 K/UL

## 2019-06-05 NOTE — PHYSICAL EXAM
[No Acute Distress] : no acute distress [Well Nourished] : well nourished [No Respiratory Distress] : no respiratory distress  [Clear to Auscultation] : lungs were clear to auscultation bilaterally [Soft] : abdomen soft [Non Tender] : non-tender [Speech Grossly Normal] : speech grossly normal

## 2019-06-05 NOTE — HISTORY OF PRESENT ILLNESS
[FreeTextEntry1] : Chest Pain [de-identified] : About two weeks ago she had intermittent chest pain for about 11/2 days.  No diaphoresis or SOB. No heartburn.\par Has been asymptomatic since.\par \par Hypertension:\par On medication.\par \par CKD:\par No symptoms

## 2019-06-05 NOTE — REVIEW OF SYSTEMS
[Fever] : no fever [Chills] : no chills [Chest Pain] : chest pain [Night Sweats] : no night sweats [Palpitations] : no palpitations [Orthopnea] : no orthopnea [Shortness Of Breath] : no shortness of breath [Wheezing] : no wheezing [Abdominal Pain] : no abdominal pain [Vomiting] : no vomiting

## 2019-06-05 NOTE — ASSESSMENT
[FreeTextEntry1] : ECG:\par No acute changes.\par \par Chest Pain:\par Does not appear to be cardiac.\par She had a normal myocardial perfusion last August.\par \par Hypertension:\par Normal blood pressure.\par \par CKD:\par Get BUN and Creatinine

## 2019-08-13 ENCOUNTER — LABORATORY RESULT (OUTPATIENT)
Age: 84
End: 2019-08-13

## 2019-08-13 ENCOUNTER — APPOINTMENT (OUTPATIENT)
Dept: INTERNAL MEDICINE | Facility: CLINIC | Age: 84
End: 2019-08-13
Payer: MEDICARE

## 2019-08-13 VITALS
BODY MASS INDEX: 32.85 KG/M2 | HEART RATE: 73 BPM | SYSTOLIC BLOOD PRESSURE: 163 MMHG | WEIGHT: 174 LBS | DIASTOLIC BLOOD PRESSURE: 82 MMHG | HEIGHT: 61 IN

## 2019-08-13 DIAGNOSIS — R30.0 DYSURIA: ICD-10-CM

## 2019-08-13 DIAGNOSIS — D64.9 ANEMIA, UNSPECIFIED: ICD-10-CM

## 2019-08-13 PROCEDURE — 36415 COLL VENOUS BLD VENIPUNCTURE: CPT

## 2019-08-13 PROCEDURE — 99214 OFFICE O/P EST MOD 30 MIN: CPT | Mod: 25

## 2019-08-14 PROBLEM — R30.0 DYSURIA: Status: ACTIVE | Noted: 2019-08-14

## 2019-08-14 LAB
ALBUMIN SERPL ELPH-MCNC: 4.1 G/DL
ALP BLD-CCNC: 59 U/L
ALT SERPL-CCNC: 7 U/L
ANION GAP SERPL CALC-SCNC: 13 MMOL/L
APPEARANCE: ABNORMAL
AST SERPL-CCNC: 12 U/L
BACTERIA: NEGATIVE
BASOPHILS # BLD AUTO: 0.05 K/UL
BASOPHILS NFR BLD AUTO: 0.7 %
BILIRUB SERPL-MCNC: 0.2 MG/DL
BILIRUBIN URINE: ABNORMAL
BLOOD URINE: NORMAL
BUN SERPL-MCNC: 28 MG/DL
CALCIUM SERPL-MCNC: 9.6 MG/DL
CHLORIDE SERPL-SCNC: 110 MMOL/L
CHOLEST SERPL-MCNC: 166 MG/DL
CHOLEST/HDLC SERPL: 3 RATIO
CO2 SERPL-SCNC: 21 MMOL/L
COLOR: ABNORMAL
CREAT SERPL-MCNC: 1.83 MG/DL
EOSINOPHIL # BLD AUTO: 0.14 K/UL
EOSINOPHIL NFR BLD AUTO: 1.9 %
GLUCOSE QUALITATIVE U: NEGATIVE
GLUCOSE SERPL-MCNC: 121 MG/DL
HCT VFR BLD CALC: 43.6 %
HDLC SERPL-MCNC: 56 MG/DL
HGB BLD-MCNC: 13 G/DL
HYALINE CASTS: 0 /LPF
IMM GRANULOCYTES NFR BLD AUTO: 0.3 %
KETONES URINE: NEGATIVE
LDLC SERPL CALC-MCNC: 62 MG/DL
LEUKOCYTE ESTERASE URINE: ABNORMAL
LYMPHOCYTES # BLD AUTO: 2.23 K/UL
LYMPHOCYTES NFR BLD AUTO: 30.3 %
MAN DIFF?: NORMAL
MCHC RBC-ENTMCNC: 29.8 GM/DL
MCHC RBC-ENTMCNC: 31.4 PG
MCV RBC AUTO: 105.3 FL
MICROSCOPIC-UA: NORMAL
MONOCYTES # BLD AUTO: 0.66 K/UL
MONOCYTES NFR BLD AUTO: 9 %
NEUTROPHILS # BLD AUTO: 4.27 K/UL
NEUTROPHILS NFR BLD AUTO: 57.8 %
NITRITE URINE: NEGATIVE
PH URINE: 8
PLATELET # BLD AUTO: 250 K/UL
POTASSIUM SERPL-SCNC: 4.8 MMOL/L
POTASSIUM SERPL-SCNC: 4.9 MMOL/L
PROT SERPL-MCNC: 6.6 G/DL
PROTEIN URINE: ABNORMAL
RBC # BLD: 4.14 M/UL
RBC # FLD: 14.6 %
RED BLOOD CELLS URINE: 3 /HPF
SAVE SPECIMEN: NORMAL
SODIUM SERPL-SCNC: 144 MMOL/L
SPECIFIC GRAVITY URINE: 1.02
SQUAMOUS EPITHELIAL CELLS: 14 /HPF
TRIGL SERPL-MCNC: 238 MG/DL
TRIPLE PHOSPHATE CRYSTALS: ABNORMAL
UROBILINOGEN URINE: NORMAL
WBC # FLD AUTO: 7.37 K/UL
WHITE BLOOD CELLS URINE: 300 /HPF

## 2019-08-14 NOTE — HISTORY OF PRESENT ILLNESS
[de-identified] : CC: Follow up of Hypertension and other medical problems\par \par HPI:\par This is a 89 year female here for evaluation of:\par \par Hypertension:\par Patient is taking her medication properly and is not having any side effects.\par \par CKD:\par Denies symptoms.\par \par Lack of appetite:\par Diet ws discussed.  No recent weight loss.\par \par Anemia:\par No SOB\par \par \par

## 2019-08-14 NOTE — ASSESSMENT
[FreeTextEntry1] : BP is elevated but I do not want to change medication at this point.  She is somewhat anxious when she comes to the office.\par \par Dysuria\par Will get culture.  Discussed taking an additional Neurontin 300 mg/d.\par \par CKD: to get BUN and Creatinine.\par \par Anemia:\par Check CBC

## 2019-08-14 NOTE — REVIEW OF SYSTEMS
[Fever] : no fever [Chills] : no chills [Chest Pain] : no chest pain [Palpitations] : no palpitations [Orthopnea] : no orthopnea [Shortness Of Breath] : no shortness of breath [Wheezing] : no wheezing [Abdominal Pain] : no abdominal pain [Nausea] : no nausea

## 2019-08-15 LAB — BACTERIA UR CULT: NORMAL

## 2019-11-12 ENCOUNTER — APPOINTMENT (OUTPATIENT)
Dept: INTERNAL MEDICINE | Facility: CLINIC | Age: 84
End: 2019-11-12
Payer: MEDICARE

## 2019-11-12 VITALS
SYSTOLIC BLOOD PRESSURE: 160 MMHG | HEART RATE: 72 BPM | DIASTOLIC BLOOD PRESSURE: 93 MMHG | WEIGHT: 174 LBS | BODY MASS INDEX: 32.85 KG/M2 | HEIGHT: 61 IN

## 2019-11-12 VITALS — BODY MASS INDEX: 33.07 KG/M2 | WEIGHT: 175 LBS

## 2019-11-12 DIAGNOSIS — L72.3 SEBACEOUS CYST: ICD-10-CM

## 2019-11-12 DIAGNOSIS — R60.9 EDEMA, UNSPECIFIED: ICD-10-CM

## 2019-11-12 DIAGNOSIS — E66.01 MORBID (SEVERE) OBESITY DUE TO EXCESS CALORIES: ICD-10-CM

## 2019-11-12 DIAGNOSIS — L08.9 SEBACEOUS CYST: ICD-10-CM

## 2019-11-12 DIAGNOSIS — N32.81 OVERACTIVE BLADDER: ICD-10-CM

## 2019-11-12 PROCEDURE — 36415 COLL VENOUS BLD VENIPUNCTURE: CPT

## 2019-11-12 PROCEDURE — 99214 OFFICE O/P EST MOD 30 MIN: CPT | Mod: 25

## 2019-11-12 NOTE — PHYSICAL EXAM
[Normal] : no respiratory distress, lungs were clear to auscultation bilaterally and no accessory muscle use [Speech Grossly Normal] : speech grossly normal [Normal Mood] : the mood was normal [de-identified] : Markedly discolored feet

## 2019-11-12 NOTE — HISTORY OF PRESENT ILLNESS
[de-identified] : CC: Follow up of Hypertension and other medical problems\par \par HPI:\par This is a 89 year female here for evaluation of:\par \par Hypertension:\par Patient is taking her medication properly and is not having any side effects.\par \par PAF:\par No symptoms.\par On Eliquis\par \par Overactive Bladder:\par Has persistent symptoms.

## 2019-11-12 NOTE — ADDENDUM
[FreeTextEntry1] : Hypertension:\par not optimally controlled but because of her age would find this acceptable.\par \par Edema:\par 3+ bilaterally; Told patient to keep her legs elevated as much as possible.\par \par PAR:\par Appears ro be in a regular rhythm.\par \par CKD:\par Repeat bloods with a plama K

## 2019-11-13 LAB
ALBUMIN SERPL ELPH-MCNC: 3.8 G/DL
ALP BLD-CCNC: 56 U/L
ALT SERPL-CCNC: 8 U/L
ANION GAP SERPL CALC-SCNC: 13 MMOL/L
AST SERPL-CCNC: 12 U/L
BASOPHILS # BLD AUTO: 0.04 K/UL
BASOPHILS NFR BLD AUTO: 0.7 %
BILIRUB SERPL-MCNC: 0.3 MG/DL
BUN SERPL-MCNC: 30 MG/DL
CALCIUM SERPL-MCNC: 9.5 MG/DL
CHLORIDE SERPL-SCNC: 108 MMOL/L
CHOLEST SERPL-MCNC: 144 MG/DL
CHOLEST/HDLC SERPL: 2.4 RATIO
CO2 SERPL-SCNC: 20 MMOL/L
CREAT SERPL-MCNC: 1.73 MG/DL
EOSINOPHIL # BLD AUTO: 0.14 K/UL
EOSINOPHIL NFR BLD AUTO: 2.5 %
GLUCOSE SERPL-MCNC: 95 MG/DL
HCT VFR BLD CALC: 40.8 %
HDLC SERPL-MCNC: 60 MG/DL
HGB BLD-MCNC: 12.6 G/DL
IMM GRANULOCYTES NFR BLD AUTO: 0.2 %
LDLC SERPL CALC-MCNC: 55 MG/DL
LYMPHOCYTES # BLD AUTO: 2.12 K/UL
LYMPHOCYTES NFR BLD AUTO: 37.6 %
MAN DIFF?: NORMAL
MCHC RBC-ENTMCNC: 30.9 GM/DL
MCHC RBC-ENTMCNC: 31.5 PG
MCV RBC AUTO: 102 FL
MONOCYTES # BLD AUTO: 0.5 K/UL
MONOCYTES NFR BLD AUTO: 8.9 %
NEUTROPHILS # BLD AUTO: 2.83 K/UL
NEUTROPHILS NFR BLD AUTO: 50.1 %
PLATELET # BLD AUTO: 211 K/UL
POTASSIUM SERPL-SCNC: 5 MMOL/L
POTASSIUM SERPL-SCNC: 5.2 MMOL/L
PROT SERPL-MCNC: 6.1 G/DL
RBC # BLD: 4 M/UL
RBC # FLD: 14.2 %
SAVE SPECIMEN: NORMAL
SODIUM SERPL-SCNC: 141 MMOL/L
TRIGL SERPL-MCNC: 147 MG/DL
WBC # FLD AUTO: 5.64 K/UL

## 2020-02-25 ENCOUNTER — NON-APPOINTMENT (OUTPATIENT)
Age: 85
End: 2020-02-25

## 2020-02-25 ENCOUNTER — APPOINTMENT (OUTPATIENT)
Dept: INTERNAL MEDICINE | Facility: CLINIC | Age: 85
End: 2020-02-25
Payer: MEDICARE

## 2020-02-25 VITALS
HEIGHT: 61 IN | HEART RATE: 56 BPM | BODY MASS INDEX: 32.85 KG/M2 | WEIGHT: 174 LBS | DIASTOLIC BLOOD PRESSURE: 81 MMHG | SYSTOLIC BLOOD PRESSURE: 139 MMHG

## 2020-02-25 PROCEDURE — 93000 ELECTROCARDIOGRAM COMPLETE: CPT

## 2020-02-25 PROCEDURE — 99214 OFFICE O/P EST MOD 30 MIN: CPT | Mod: 25

## 2020-02-25 PROCEDURE — 36415 COLL VENOUS BLD VENIPUNCTURE: CPT

## 2020-02-26 LAB
ALBUMIN SERPL ELPH-MCNC: 4.2 G/DL
ALP BLD-CCNC: 61 U/L
ALT SERPL-CCNC: 8 U/L
ANION GAP SERPL CALC-SCNC: 11 MMOL/L
AST SERPL-CCNC: 12 U/L
BASOPHILS # BLD AUTO: 0.06 K/UL
BASOPHILS NFR BLD AUTO: 1 %
BILIRUB SERPL-MCNC: 0.2 MG/DL
BUN SERPL-MCNC: 29 MG/DL
CALCIUM SERPL-MCNC: 9.7 MG/DL
CHLORIDE SERPL-SCNC: 108 MMOL/L
CHOLEST SERPL-MCNC: 150 MG/DL
CHOLEST/HDLC SERPL: 2.2 RATIO
CO2 SERPL-SCNC: 22 MMOL/L
CREAT SERPL-MCNC: 1.76 MG/DL
EOSINOPHIL # BLD AUTO: 0.15 K/UL
EOSINOPHIL NFR BLD AUTO: 2.5 %
GLUCOSE SERPL-MCNC: 94 MG/DL
HCT VFR BLD CALC: 42 %
HDLC SERPL-MCNC: 67 MG/DL
HGB BLD-MCNC: 12.6 G/DL
IMM GRANULOCYTES NFR BLD AUTO: 0.3 %
LDLC SERPL CALC-MCNC: 49 MG/DL
LYMPHOCYTES # BLD AUTO: 1.86 K/UL
LYMPHOCYTES NFR BLD AUTO: 31.1 %
MAN DIFF?: NORMAL
MCHC RBC-ENTMCNC: 30 GM/DL
MCHC RBC-ENTMCNC: 31.3 PG
MCV RBC AUTO: 104.5 FL
MONOCYTES # BLD AUTO: 0.59 K/UL
MONOCYTES NFR BLD AUTO: 9.9 %
NEUTROPHILS # BLD AUTO: 3.3 K/UL
NEUTROPHILS NFR BLD AUTO: 55.2 %
PLATELET # BLD AUTO: 231 K/UL
POTASSIUM SERPL-SCNC: 5.3 MMOL/L
PROT SERPL-MCNC: 6.6 G/DL
RBC # BLD: 4.02 M/UL
RBC # FLD: 14.3 %
SAVE SPECIMEN: NORMAL
SODIUM SERPL-SCNC: 141 MMOL/L
TRIGL SERPL-MCNC: 167 MG/DL
WBC # FLD AUTO: 5.98 K/UL

## 2020-02-28 NOTE — HISTORY OF PRESENT ILLNESS
[de-identified] : CC: The patient is here to follow up on her chronic medical problems.\par Patient's daughter reports that the patient had an episode about 3 weeks ago of having mildly slurred speech which spontaneously disappeared after a few minutes.  No recurrent episodes.\par \par Hypertenison:\par On medication.\par \par Atrial Fibrillation:\par No symptoms.  On an anticoagulant.\par \par PVD:\par No foot pain.

## 2020-02-28 NOTE — ASSESSMENT
[FreeTextEntry1] : TIA by history.  As discussed with her daughter would not do any testing at this point.  She is already on an anticoagulant.\par \par Hypertension:\par Well controlled.\par Continue current medication.\par Low salt diet.\par \par Atrial Fibrillation:\par ECG show frequent APC; It does not appear that she is in Afib.\par \par CKD:\par Get creatinine and BUN

## 2020-03-02 ENCOUNTER — FORM ENCOUNTER (OUTPATIENT)
Age: 85
End: 2020-03-02

## 2020-03-03 ENCOUNTER — APPOINTMENT (OUTPATIENT)
Dept: INTERNAL MEDICINE | Facility: CLINIC | Age: 85
End: 2020-03-03
Payer: MEDICARE

## 2020-03-03 ENCOUNTER — OUTPATIENT (OUTPATIENT)
Dept: OUTPATIENT SERVICES | Facility: HOSPITAL | Age: 85
LOS: 1 days | End: 2020-03-03
Payer: MEDICARE

## 2020-03-03 ENCOUNTER — APPOINTMENT (OUTPATIENT)
Dept: CT IMAGING | Facility: CLINIC | Age: 85
End: 2020-03-03
Payer: MEDICARE

## 2020-03-03 VITALS
HEIGHT: 61 IN | SYSTOLIC BLOOD PRESSURE: 144 MMHG | BODY MASS INDEX: 32.85 KG/M2 | WEIGHT: 174 LBS | HEART RATE: 80 BPM | DIASTOLIC BLOOD PRESSURE: 81 MMHG

## 2020-03-03 DIAGNOSIS — R41.0 DISORIENTATION, UNSPECIFIED: ICD-10-CM

## 2020-03-03 DIAGNOSIS — Z96.659 PRESENCE OF UNSPECIFIED ARTIFICIAL KNEE JOINT: Chronic | ICD-10-CM

## 2020-03-03 DIAGNOSIS — R41.3 OTHER AMNESIA: ICD-10-CM

## 2020-03-03 DIAGNOSIS — Z98.89 OTHER SPECIFIED POSTPROCEDURAL STATES: Chronic | ICD-10-CM

## 2020-03-03 PROCEDURE — 70450 CT HEAD/BRAIN W/O DYE: CPT | Mod: 26

## 2020-03-03 PROCEDURE — 36415 COLL VENOUS BLD VENIPUNCTURE: CPT

## 2020-03-03 PROCEDURE — 99215 OFFICE O/P EST HI 40 MIN: CPT | Mod: 25

## 2020-03-03 PROCEDURE — 70450 CT HEAD/BRAIN W/O DYE: CPT

## 2020-03-03 NOTE — ASSESSMENT
[FreeTextEntry1] : Patient is exhibiting signs of severe dementia.  Unable to recall date, president or month.\par No focal neurological deficits other than mentation were identified.\par She does not seem to be toxic.\par \par I had a long discussion with both daughters and her son-in-law.  We all agreed that there would not be a purpose to admit her to the hospital.  I will have her seen by a Neurologist today.  In the meantime, I have told her to take the Bactrim-DS that she has at home for 5 days just in case that she has an occult urinary tract infection.

## 2020-03-03 NOTE — HISTORY OF PRESENT ILLNESS
[de-identified] : 89 year old female here today brought in with family. She has been having trouble with speech, word finding, and understanding for the past day. She has been becoming more forgetful recently.\par \par She did have a  CT earlier today which did not show any acute pathology.\par No fever or chills.

## 2020-03-03 NOTE — PHYSICAL EXAM
[Person] : oriented to person [Disordered] : disordered thoughts [Well Nourished] : well nourished [No Acute Distress] : no acute distress [No JVD] : no jugular venous distention [Normal Voice/Communication] : normal voice/communication [No Lymphadenopathy] : no lymphadenopathy [Normal Rate] : normal rate  [Non Tender] : non-tender [No HSM] : no HSM [Soft] : abdomen soft [No Rash] : no rash [Coordination Grossly Intact] : coordination grossly intact [Normal Bowel Sounds] : normal bowel sounds [No Focal Deficits] : no focal deficits [Time] : disoriented to time [Short Term Intact] : short term memory impaired [Past History] : inadequate fund of knowledge regarding past history [Remote Intact] : remote memory impaired [de-identified] : +receptive aphasia, +expressive aphasia

## 2020-03-04 LAB
ALBUMIN SERPL ELPH-MCNC: 4.1 G/DL
ALP BLD-CCNC: 56 U/L
ALT SERPL-CCNC: 6 U/L
ANION GAP SERPL CALC-SCNC: 16 MMOL/L
AST SERPL-CCNC: 15 U/L
BASOPHILS # BLD AUTO: 0.04 K/UL
BASOPHILS NFR BLD AUTO: 0.5 %
BILIRUB SERPL-MCNC: 0.5 MG/DL
BUN SERPL-MCNC: 25 MG/DL
CALCIUM SERPL-MCNC: 9.7 MG/DL
CHLORIDE SERPL-SCNC: 102 MMOL/L
CO2 SERPL-SCNC: 20 MMOL/L
CREAT SERPL-MCNC: 1.95 MG/DL
EOSINOPHIL # BLD AUTO: 0.05 K/UL
EOSINOPHIL NFR BLD AUTO: 0.6 %
GLUCOSE SERPL-MCNC: 96 MG/DL
HCT VFR BLD CALC: 41.4 %
HGB BLD-MCNC: 12.7 G/DL
IMM GRANULOCYTES NFR BLD AUTO: 0.3 %
LYMPHOCYTES # BLD AUTO: 2.29 K/UL
LYMPHOCYTES NFR BLD AUTO: 26.5 %
MAN DIFF?: NORMAL
MCHC RBC-ENTMCNC: 30.7 GM/DL
MCHC RBC-ENTMCNC: 31.2 PG
MCV RBC AUTO: 101.7 FL
MONOCYTES # BLD AUTO: 0.87 K/UL
MONOCYTES NFR BLD AUTO: 10.1 %
NEUTROPHILS # BLD AUTO: 5.37 K/UL
NEUTROPHILS NFR BLD AUTO: 62 %
PLATELET # BLD AUTO: 243 K/UL
POTASSIUM SERPL-SCNC: 4.6 MMOL/L
PROT SERPL-MCNC: 6.6 G/DL
RBC # BLD: 4.07 M/UL
RBC # FLD: 13.9 %
SAVE SPECIMEN: NORMAL
SODIUM SERPL-SCNC: 137 MMOL/L
WBC # FLD AUTO: 8.65 K/UL

## 2020-05-26 ENCOUNTER — APPOINTMENT (OUTPATIENT)
Dept: INTERNAL MEDICINE | Facility: CLINIC | Age: 85
End: 2020-05-26

## 2020-09-09 NOTE — ASSESSMENT
[FreeTextEntry1] : Cough: No evidence of lower respiratory problems.\par \par CKD: No longer on Kayexalate\par \par Weight Loss:\par Probably related to extensive dental work.\par \par Left leg laceration.\par \par PVD: stable.\par Hypertension: stable.\par \par On anticoagulation. pt.

## 2021-01-01 ENCOUNTER — APPOINTMENT (OUTPATIENT)
Dept: INTERNAL MEDICINE | Facility: CLINIC | Age: 86
End: 2021-01-01
Payer: MEDICARE

## 2021-01-01 ENCOUNTER — LABORATORY RESULT (OUTPATIENT)
Age: 86
End: 2021-01-01

## 2021-01-01 ENCOUNTER — INPATIENT (INPATIENT)
Facility: HOSPITAL | Age: 86
LOS: 2 days | End: 2021-11-22
Attending: STUDENT IN AN ORGANIZED HEALTH CARE EDUCATION/TRAINING PROGRAM | Admitting: STUDENT IN AN ORGANIZED HEALTH CARE EDUCATION/TRAINING PROGRAM
Payer: MEDICARE

## 2021-01-01 ENCOUNTER — NON-APPOINTMENT (OUTPATIENT)
Age: 86
End: 2021-01-01

## 2021-01-01 VITALS
WEIGHT: 169 LBS | BODY MASS INDEX: 31.91 KG/M2 | HEART RATE: 73 BPM | HEIGHT: 61 IN | TEMPERATURE: 96.6 F | SYSTOLIC BLOOD PRESSURE: 150 MMHG | DIASTOLIC BLOOD PRESSURE: 89 MMHG

## 2021-01-01 VITALS
HEART RATE: 73 BPM | RESPIRATION RATE: 18 BRPM | SYSTOLIC BLOOD PRESSURE: 145 MMHG | DIASTOLIC BLOOD PRESSURE: 77 MMHG | HEIGHT: 63 IN | TEMPERATURE: 98 F | OXYGEN SATURATION: 100 %

## 2021-01-01 VITALS
SYSTOLIC BLOOD PRESSURE: 76 MMHG | HEART RATE: 68 BPM | TEMPERATURE: 97 F | RESPIRATION RATE: 20 BRPM | DIASTOLIC BLOOD PRESSURE: 39 MMHG | OXYGEN SATURATION: 90 %

## 2021-01-01 DIAGNOSIS — N18.4 CHRONIC KIDNEY DISEASE, STAGE 4 (SEVERE): ICD-10-CM

## 2021-01-01 DIAGNOSIS — E87.5 HYPERKALEMIA: ICD-10-CM

## 2021-01-01 DIAGNOSIS — R55 SYNCOPE AND COLLAPSE: ICD-10-CM

## 2021-01-01 DIAGNOSIS — Z98.89 OTHER SPECIFIED POSTPROCEDURAL STATES: Chronic | ICD-10-CM

## 2021-01-01 DIAGNOSIS — G30.9 ALZHEIMER'S DISEASE, UNSPECIFIED: ICD-10-CM

## 2021-01-01 DIAGNOSIS — N39.0 URINARY TRACT INFECTION, SITE NOT SPECIFIED: ICD-10-CM

## 2021-01-01 DIAGNOSIS — J96.01 ACUTE RESPIRATORY FAILURE WITH HYPOXIA: ICD-10-CM

## 2021-01-01 DIAGNOSIS — R63.4 ABNORMAL WEIGHT LOSS: ICD-10-CM

## 2021-01-01 DIAGNOSIS — S30.1XXA CONTUSION OF ABDOMINAL WALL, INITIAL ENCOUNTER: ICD-10-CM

## 2021-01-01 DIAGNOSIS — I73.9 PERIPHERAL VASCULAR DISEASE, UNSPECIFIED: ICD-10-CM

## 2021-01-01 DIAGNOSIS — I48.0 PAROXYSMAL ATRIAL FIBRILLATION: ICD-10-CM

## 2021-01-01 DIAGNOSIS — Z79.01 LONG TERM (CURRENT) USE OF ANTICOAGULANTS: ICD-10-CM

## 2021-01-01 DIAGNOSIS — Z71.89 OTHER SPECIFIED COUNSELING: ICD-10-CM

## 2021-01-01 DIAGNOSIS — R63.8 OTHER SYMPTOMS AND SIGNS CONCERNING FOOD AND FLUID INTAKE: ICD-10-CM

## 2021-01-01 DIAGNOSIS — F02.80 ALZHEIMER'S DISEASE, UNSPECIFIED: ICD-10-CM

## 2021-01-01 DIAGNOSIS — I10 ESSENTIAL (PRIMARY) HYPERTENSION: ICD-10-CM

## 2021-01-01 DIAGNOSIS — R29.6 REPEATED FALLS: ICD-10-CM

## 2021-01-01 DIAGNOSIS — Z96.659 PRESENCE OF UNSPECIFIED ARTIFICIAL KNEE JOINT: Chronic | ICD-10-CM

## 2021-01-01 DIAGNOSIS — I48.20 CHRONIC ATRIAL FIBRILLATION, UNSPECIFIED: ICD-10-CM

## 2021-01-01 DIAGNOSIS — R41.0 DISORIENTATION, UNSPECIFIED: ICD-10-CM

## 2021-01-01 LAB
-  COAGULASE NEGATIVE STAPHYLOCOCCUS: SIGNIFICANT CHANGE UP
25(OH)D3 SERPL-MCNC: 17.7 NG/ML
ALBUMIN SERPL ELPH-MCNC: 3.8 G/DL — SIGNIFICANT CHANGE UP (ref 3.3–5)
ALBUMIN SERPL ELPH-MCNC: 4.4 G/DL
ALP BLD-CCNC: 61 U/L
ALP SERPL-CCNC: 51 U/L — SIGNIFICANT CHANGE UP (ref 40–120)
ALT FLD-CCNC: 13 U/L — SIGNIFICANT CHANGE UP (ref 4–33)
ALT SERPL-CCNC: 9 U/L
ANION GAP SERPL CALC-SCNC: 11 MMOL/L — SIGNIFICANT CHANGE UP (ref 7–14)
ANION GAP SERPL CALC-SCNC: 12 MMOL/L — SIGNIFICANT CHANGE UP (ref 7–14)
ANION GAP SERPL CALC-SCNC: 13 MMOL/L
ANION GAP SERPL CALC-SCNC: 15 MMOL/L — HIGH (ref 7–14)
APPEARANCE UR: ABNORMAL
APPEARANCE: ABNORMAL
APTT BLD: 32.2 SEC — SIGNIFICANT CHANGE UP (ref 27–36.3)
AST SERPL-CCNC: 12 U/L
AST SERPL-CCNC: 22 U/L — SIGNIFICANT CHANGE UP (ref 4–32)
BACTERIA # UR AUTO: NEGATIVE — SIGNIFICANT CHANGE UP
BACTERIA UR CULT: NORMAL
BACTERIA: NEGATIVE
BASE EXCESS BLDV CALC-SCNC: -7.7 MMOL/L — LOW (ref -2–3)
BASOPHILS # BLD AUTO: 0.02 K/UL — SIGNIFICANT CHANGE UP (ref 0–0.2)
BASOPHILS # BLD AUTO: 0.07 K/UL
BASOPHILS NFR BLD AUTO: 0.2 % — SIGNIFICANT CHANGE UP (ref 0–2)
BASOPHILS NFR BLD AUTO: 0.9 %
BILIRUB SERPL-MCNC: 0.2 MG/DL
BILIRUB SERPL-MCNC: 0.3 MG/DL — SIGNIFICANT CHANGE UP (ref 0.2–1.2)
BILIRUB UR-MCNC: ABNORMAL
BILIRUBIN URINE: NEGATIVE
BLOOD GAS VENOUS COMPREHENSIVE RESULT: SIGNIFICANT CHANGE UP
BLOOD URINE: NEGATIVE
BUN SERPL-MCNC: 31 MG/DL
BUN SERPL-MCNC: 46 MG/DL — HIGH (ref 7–23)
BUN SERPL-MCNC: 47 MG/DL — HIGH (ref 7–23)
BUN SERPL-MCNC: 63 MG/DL — HIGH (ref 7–23)
CALCIUM SERPL-MCNC: 9.6 MG/DL — SIGNIFICANT CHANGE UP (ref 8.4–10.5)
CALCIUM SERPL-MCNC: 9.7 MG/DL
CALCIUM SERPL-MCNC: 9.9 MG/DL — SIGNIFICANT CHANGE UP (ref 8.4–10.5)
CALCIUM SERPL-MCNC: 9.9 MG/DL — SIGNIFICANT CHANGE UP (ref 8.4–10.5)
CHLORIDE BLDV-SCNC: 110 MMOL/L — HIGH (ref 96–108)
CHLORIDE SERPL-SCNC: 108 MMOL/L — HIGH (ref 98–107)
CHLORIDE SERPL-SCNC: 110 MMOL/L — HIGH (ref 98–107)
CHLORIDE SERPL-SCNC: 111 MMOL/L
CHLORIDE SERPL-SCNC: 111 MMOL/L — HIGH (ref 98–107)
CHOLEST SERPL-MCNC: 165 MG/DL
CO2 BLDV-SCNC: 21.6 MMOL/L — LOW (ref 22–26)
CO2 SERPL-SCNC: 14 MMOL/L — LOW (ref 22–31)
CO2 SERPL-SCNC: 17 MMOL/L — LOW (ref 22–31)
CO2 SERPL-SCNC: 18 MMOL/L — LOW (ref 22–31)
CO2 SERPL-SCNC: 19 MMOL/L
COLOR SPEC: YELLOW — SIGNIFICANT CHANGE UP
COLOR: YELLOW
COVID-19 SPIKE DOMAIN AB INTERP: POSITIVE
COVID-19 SPIKE DOMAIN ANTIBODY RESULT: >250 U/ML — HIGH
CREAT SERPL-MCNC: 1.88 MG/DL
CREAT SERPL-MCNC: 2.91 MG/DL — HIGH (ref 0.5–1.3)
CREAT SERPL-MCNC: 3.01 MG/DL — HIGH (ref 0.5–1.3)
CREAT SERPL-MCNC: 3.77 MG/DL — HIGH (ref 0.5–1.3)
CULTURE RESULTS: SIGNIFICANT CHANGE UP
CULTURE RESULTS: SIGNIFICANT CHANGE UP
DIFF PNL FLD: ABNORMAL
EOSINOPHIL # BLD AUTO: 0.02 K/UL — SIGNIFICANT CHANGE UP (ref 0–0.5)
EOSINOPHIL # BLD AUTO: 0.17 K/UL
EOSINOPHIL NFR BLD AUTO: 0.2 % — SIGNIFICANT CHANGE UP (ref 0–6)
EOSINOPHIL NFR BLD AUTO: 2.3 %
EPI CELLS # UR: 3 /HPF — SIGNIFICANT CHANGE UP (ref 0–5)
ESTIMATED AVERAGE GLUCOSE: 111 MG/DL
GAS PNL BLDV: 136 MMOL/L — SIGNIFICANT CHANGE UP (ref 136–145)
GLUCOSE BLDV-MCNC: 89 MG/DL — SIGNIFICANT CHANGE UP (ref 70–99)
GLUCOSE QUALITATIVE U: NEGATIVE
GLUCOSE SERPL-MCNC: 109 MG/DL — HIGH (ref 70–99)
GLUCOSE SERPL-MCNC: 85 MG/DL — SIGNIFICANT CHANGE UP (ref 70–99)
GLUCOSE SERPL-MCNC: 90 MG/DL — SIGNIFICANT CHANGE UP (ref 70–99)
GLUCOSE SERPL-MCNC: 95 MG/DL
GLUCOSE UR QL: NEGATIVE — SIGNIFICANT CHANGE UP
GRAM STN FLD: SIGNIFICANT CHANGE UP
HBA1C MFR BLD HPLC: 5.5 %
HCO3 BLDV-SCNC: 20 MMOL/L — LOW (ref 22–29)
HCT VFR BLD CALC: 40.5 % — SIGNIFICANT CHANGE UP (ref 34.5–45)
HCT VFR BLD CALC: 42.1 % — SIGNIFICANT CHANGE UP (ref 34.5–45)
HCT VFR BLD CALC: 42.6 %
HCT VFR BLD CALC: 45.1 % — HIGH (ref 34.5–45)
HCT VFR BLDA CALC: 37 % — SIGNIFICANT CHANGE UP (ref 34.5–46.5)
HDLC SERPL-MCNC: 58 MG/DL
HGB BLD CALC-MCNC: 12.3 G/DL — SIGNIFICANT CHANGE UP (ref 11.5–15.5)
HGB BLD-MCNC: 12.7 G/DL
HGB BLD-MCNC: 12.7 G/DL — SIGNIFICANT CHANGE UP (ref 11.5–15.5)
HGB BLD-MCNC: 12.8 G/DL — SIGNIFICANT CHANGE UP (ref 11.5–15.5)
HGB BLD-MCNC: 12.9 G/DL — SIGNIFICANT CHANGE UP (ref 11.5–15.5)
HYALINE CASTS # UR AUTO: 0 /LPF — SIGNIFICANT CHANGE UP (ref 0–7)
HYALINE CASTS: 0 /LPF
IANC: 6.64 K/UL — SIGNIFICANT CHANGE UP (ref 1.5–8.5)
IMM GRANULOCYTES NFR BLD AUTO: 0.4 %
IMM GRANULOCYTES NFR BLD AUTO: 0.6 % — SIGNIFICANT CHANGE UP (ref 0–1.5)
INR BLD: 1.26 RATIO — HIGH (ref 0.88–1.16)
KETONES UR-MCNC: NEGATIVE — SIGNIFICANT CHANGE UP
KETONES URINE: NORMAL
LACTATE BLDV-MCNC: 1.5 MMOL/L — SIGNIFICANT CHANGE UP (ref 0.5–2)
LDLC SERPL CALC-MCNC: 73 MG/DL
LEUKOCYTE ESTERASE UR-ACNC: ABNORMAL
LEUKOCYTE ESTERASE URINE: ABNORMAL
LYMPHOCYTES # BLD AUTO: 1.5 K/UL — SIGNIFICANT CHANGE UP (ref 1–3.3)
LYMPHOCYTES # BLD AUTO: 16.9 % — SIGNIFICANT CHANGE UP (ref 13–44)
LYMPHOCYTES # BLD AUTO: 2.82 K/UL
LYMPHOCYTES NFR BLD AUTO: 37.6 %
MAN DIFF?: NORMAL
MCHC RBC-ENTMCNC: 28.6 GM/DL — LOW (ref 32–36)
MCHC RBC-ENTMCNC: 29.8 GM/DL
MCHC RBC-ENTMCNC: 30.4 GM/DL — LOW (ref 32–36)
MCHC RBC-ENTMCNC: 31.2 PG — SIGNIFICANT CHANGE UP (ref 27–34)
MCHC RBC-ENTMCNC: 31.4 GM/DL — LOW (ref 32–36)
MCHC RBC-ENTMCNC: 31.4 PG
MCHC RBC-ENTMCNC: 31.8 PG — SIGNIFICANT CHANGE UP (ref 27–34)
MCHC RBC-ENTMCNC: 32 PG — SIGNIFICANT CHANGE UP (ref 27–34)
MCV RBC AUTO: 102 FL — HIGH (ref 80–100)
MCV RBC AUTO: 104.7 FL — HIGH (ref 80–100)
MCV RBC AUTO: 105.4 FL
MCV RBC AUTO: 109.2 FL — HIGH (ref 80–100)
METHOD TYPE: SIGNIFICANT CHANGE UP
MICROSCOPIC-UA: NORMAL
MONOCYTES # BLD AUTO: 0.64 K/UL
MONOCYTES # BLD AUTO: 0.65 K/UL — SIGNIFICANT CHANGE UP (ref 0–0.9)
MONOCYTES NFR BLD AUTO: 7.3 % — SIGNIFICANT CHANGE UP (ref 2–14)
MONOCYTES NFR BLD AUTO: 8.5 %
NEUTROPHILS # BLD AUTO: 3.77 K/UL
NEUTROPHILS # BLD AUTO: 6.64 K/UL — SIGNIFICANT CHANGE UP (ref 1.8–7.4)
NEUTROPHILS NFR BLD AUTO: 50.3 %
NEUTROPHILS NFR BLD AUTO: 74.8 % — SIGNIFICANT CHANGE UP (ref 43–77)
NITRITE UR-MCNC: NEGATIVE — SIGNIFICANT CHANGE UP
NITRITE URINE: NEGATIVE
NONHDLC SERPL-MCNC: 107 MG/DL
NRBC # BLD: 0 /100 WBCS — SIGNIFICANT CHANGE UP
NRBC # BLD: 0 /100 WBCS — SIGNIFICANT CHANGE UP
NRBC # BLD: 2 /100 WBCS — SIGNIFICANT CHANGE UP
NRBC # FLD: 0.06 K/UL — HIGH
NRBC # FLD: 0.08 K/UL — HIGH
NRBC # FLD: 0.28 K/UL — HIGH
NT-PROBNP SERPL-SCNC: HIGH PG/ML
ORGANISM # SPEC MICROSCOPIC CNT: SIGNIFICANT CHANGE UP
ORGANISM # SPEC MICROSCOPIC CNT: SIGNIFICANT CHANGE UP
PCO2 BLDV: 49 MMHG — HIGH (ref 39–42)
PH BLDV: 7.22 — LOW (ref 7.32–7.43)
PH UR: 6 — SIGNIFICANT CHANGE UP (ref 5–8)
PH URINE: 6
PLATELET # BLD AUTO: 199 K/UL — SIGNIFICANT CHANGE UP (ref 150–400)
PLATELET # BLD AUTO: 204 K/UL — SIGNIFICANT CHANGE UP (ref 150–400)
PLATELET # BLD AUTO: 225 K/UL — SIGNIFICANT CHANGE UP (ref 150–400)
PLATELET # BLD AUTO: 254 K/UL
PO2 BLDV: <20 MMHG — SIGNIFICANT CHANGE UP
POTASSIUM BLDV-SCNC: 5.9 MMOL/L — HIGH (ref 3.5–5.1)
POTASSIUM SERPL-MCNC: 5.1 MMOL/L — SIGNIFICANT CHANGE UP (ref 3.5–5.3)
POTASSIUM SERPL-MCNC: 6.4 MMOL/L — CRITICAL HIGH (ref 3.5–5.3)
POTASSIUM SERPL-MCNC: SIGNIFICANT CHANGE UP MMOL/L (ref 3.5–5.3)
POTASSIUM SERPL-SCNC: 5.1 MMOL/L — SIGNIFICANT CHANGE UP (ref 3.5–5.3)
POTASSIUM SERPL-SCNC: 5.6 MMOL/L
POTASSIUM SERPL-SCNC: 6.4 MMOL/L — CRITICAL HIGH (ref 3.5–5.3)
POTASSIUM SERPL-SCNC: SIGNIFICANT CHANGE UP MMOL/L (ref 3.5–5.3)
PROCALCITONIN SERPL-MCNC: 0.09 NG/ML — SIGNIFICANT CHANGE UP (ref 0.02–0.1)
PROT SERPL-MCNC: 6.5 G/DL — SIGNIFICANT CHANGE UP (ref 6–8.3)
PROT SERPL-MCNC: 6.6 G/DL
PROT UR-MCNC: ABNORMAL
PROTEIN URINE: ABNORMAL
PROTHROM AB SERPL-ACNC: 14.2 SEC — HIGH (ref 10.6–13.6)
RBC # BLD: 3.97 M/UL — SIGNIFICANT CHANGE UP (ref 3.8–5.2)
RBC # BLD: 4.02 M/UL — SIGNIFICANT CHANGE UP (ref 3.8–5.2)
RBC # BLD: 4.04 M/UL
RBC # BLD: 4.13 M/UL — SIGNIFICANT CHANGE UP (ref 3.8–5.2)
RBC # FLD: 14 %
RBC # FLD: 16.1 % — HIGH (ref 10.3–14.5)
RBC # FLD: 16.5 % — HIGH (ref 10.3–14.5)
RBC # FLD: 16.6 % — HIGH (ref 10.3–14.5)
RBC CASTS # UR COMP ASSIST: 1 /HPF — SIGNIFICANT CHANGE UP (ref 0–4)
RED BLOOD CELLS URINE: 4 /HPF
SAO2 % BLDV: 27 % — SIGNIFICANT CHANGE UP
SARS-COV-2 IGG+IGM SERPL QL IA: >250 U/ML — HIGH
SARS-COV-2 IGG+IGM SERPL QL IA: POSITIVE
SARS-COV-2 RNA SPEC QL NAA+PROBE: SIGNIFICANT CHANGE UP
SODIUM SERPL-SCNC: 137 MMOL/L — SIGNIFICANT CHANGE UP (ref 135–145)
SODIUM SERPL-SCNC: 139 MMOL/L — SIGNIFICANT CHANGE UP (ref 135–145)
SODIUM SERPL-SCNC: 140 MMOL/L — SIGNIFICANT CHANGE UP (ref 135–145)
SODIUM SERPL-SCNC: 143 MMOL/L
SP GR SPEC: 1.04 — SIGNIFICANT CHANGE UP (ref 1–1.05)
SPECIFIC GRAVITY URINE: 1.03
SPECIMEN SOURCE: SIGNIFICANT CHANGE UP
SQUAMOUS EPITHELIAL CELLS: 18 /HPF
T4 SERPL-MCNC: 5.3 UG/DL
TRIGL SERPL-MCNC: 172 MG/DL
TROPONIN T, HIGH SENSITIVITY RESULT: 114 NG/L — CRITICAL HIGH
TROPONIN T, HIGH SENSITIVITY RESULT: 124 NG/L — CRITICAL HIGH
TSH SERPL-ACNC: 3.19 UIU/ML
URATE CRY FLD QL MICRO: ABNORMAL
URATE SERPL-MCNC: 6.5 MG/DL
UROBILINOGEN FLD QL: SIGNIFICANT CHANGE UP
UROBILINOGEN URINE: ABNORMAL
WBC # BLD: 10.16 K/UL — SIGNIFICANT CHANGE UP (ref 3.8–10.5)
WBC # BLD: 13.93 K/UL — HIGH (ref 3.8–10.5)
WBC # BLD: 8.88 K/UL — SIGNIFICANT CHANGE UP (ref 3.8–10.5)
WBC # FLD AUTO: 10.16 K/UL — SIGNIFICANT CHANGE UP (ref 3.8–10.5)
WBC # FLD AUTO: 13.93 K/UL — HIGH (ref 3.8–10.5)
WBC # FLD AUTO: 7.5 K/UL
WBC # FLD AUTO: 8.88 K/UL — SIGNIFICANT CHANGE UP (ref 3.8–10.5)
WBC UR QL: 1 /HPF — SIGNIFICANT CHANGE UP (ref 0–5)
WHITE BLOOD CELLS URINE: 5 /HPF

## 2021-01-01 PROCEDURE — 99285 EMERGENCY DEPT VISIT HI MDM: CPT | Mod: CS,25

## 2021-01-01 PROCEDURE — 99215 OFFICE O/P EST HI 40 MIN: CPT | Mod: 25

## 2021-01-01 PROCEDURE — 99497 ADVNCD CARE PLAN 30 MIN: CPT

## 2021-01-01 PROCEDURE — 93010 ELECTROCARDIOGRAM REPORT: CPT

## 2021-01-01 PROCEDURE — 72125 CT NECK SPINE W/O DYE: CPT | Mod: 26,MA

## 2021-01-01 PROCEDURE — 74176 CT ABD & PELVIS W/O CONTRAST: CPT | Mod: 26,MA

## 2021-01-01 PROCEDURE — 70450 CT HEAD/BRAIN W/O DYE: CPT | Mod: 26,MA

## 2021-01-01 PROCEDURE — 36415 COLL VENOUS BLD VENIPUNCTURE: CPT

## 2021-01-01 PROCEDURE — 12345: CPT | Mod: NC

## 2021-01-01 PROCEDURE — 93000 ELECTROCARDIOGRAM COMPLETE: CPT

## 2021-01-01 PROCEDURE — 73590 X-RAY EXAM OF LOWER LEG: CPT | Mod: 26,RT

## 2021-01-01 PROCEDURE — 99232 SBSQ HOSP IP/OBS MODERATE 35: CPT

## 2021-01-01 PROCEDURE — 71250 CT THORAX DX C-: CPT | Mod: 26,MA

## 2021-01-01 RX ORDER — SODIUM CHLORIDE 9 MG/ML
500 INJECTION, SOLUTION INTRAVENOUS ONCE
Refills: 0 | Status: COMPLETED | OUTPATIENT
Start: 2021-01-01 | End: 2021-01-01

## 2021-01-01 RX ORDER — APIXABAN 2.5 MG/1
1 TABLET, FILM COATED ORAL
Qty: 0 | Refills: 0 | DISCHARGE

## 2021-01-01 RX ORDER — PROTHROMBIN COMPLEX CONCENTRATE (HUMAN) 25.5; 16.5; 24; 22; 22; 26 [IU]/ML; [IU]/ML; [IU]/ML; [IU]/ML; [IU]/ML; [IU]/ML
2000 POWDER, FOR SOLUTION INTRAVENOUS ONCE
Refills: 0 | Status: COMPLETED | OUTPATIENT
Start: 2021-01-01 | End: 2021-01-01

## 2021-01-01 RX ORDER — HALOPERIDOL DECANOATE 100 MG/ML
1 INJECTION INTRAMUSCULAR ONCE
Refills: 0 | Status: COMPLETED | OUTPATIENT
Start: 2021-01-01 | End: 2021-01-01

## 2021-01-01 RX ORDER — INSULIN HUMAN 100 [IU]/ML
7 INJECTION, SOLUTION SUBCUTANEOUS ONCE
Refills: 0 | Status: COMPLETED | OUTPATIENT
Start: 2021-01-01 | End: 2021-01-01

## 2021-01-01 RX ORDER — TRAMADOL HYDROCHLORIDE 50 MG/1
2 TABLET ORAL
Qty: 0 | Refills: 0 | DISCHARGE

## 2021-01-01 RX ORDER — HYDROMORPHONE HYDROCHLORIDE 2 MG/ML
0.5 INJECTION INTRAMUSCULAR; INTRAVENOUS; SUBCUTANEOUS EVERY 4 HOURS
Refills: 0 | Status: DISCONTINUED | OUTPATIENT
Start: 2021-01-01 | End: 2021-01-01

## 2021-01-01 RX ORDER — HYDROMORPHONE HYDROCHLORIDE 2 MG/ML
0.25 INJECTION INTRAMUSCULAR; INTRAVENOUS; SUBCUTANEOUS
Refills: 0 | Status: DISCONTINUED | OUTPATIENT
Start: 2021-01-01 | End: 2021-01-01

## 2021-01-01 RX ORDER — SODIUM ZIRCONIUM CYCLOSILICATE 10 G/10G
10 POWDER, FOR SUSPENSION ORAL ONCE
Refills: 0 | Status: COMPLETED | OUTPATIENT
Start: 2021-01-01 | End: 2021-01-01

## 2021-01-01 RX ORDER — FUROSEMIDE 40 MG
40 TABLET ORAL ONCE
Refills: 0 | Status: COMPLETED | OUTPATIENT
Start: 2021-01-01 | End: 2021-01-01

## 2021-01-01 RX ORDER — METOPROLOL TARTRATE 50 MG
5 TABLET ORAL EVERY 6 HOURS
Refills: 0 | Status: DISCONTINUED | OUTPATIENT
Start: 2021-01-01 | End: 2021-01-01

## 2021-01-01 RX ORDER — GABAPENTIN 400 MG/1
2 CAPSULE ORAL
Qty: 0 | Refills: 0 | DISCHARGE

## 2021-01-01 RX ORDER — INSULIN HUMAN 100 [IU]/ML
7 INJECTION, SOLUTION SUBCUTANEOUS ONCE
Refills: 0 | Status: DISCONTINUED | OUTPATIENT
Start: 2021-01-01 | End: 2021-01-01

## 2021-01-01 RX ORDER — BUMETANIDE 0.25 MG/ML
1 INJECTION INTRAMUSCULAR; INTRAVENOUS ONCE
Refills: 0 | Status: COMPLETED | OUTPATIENT
Start: 2021-01-01 | End: 2021-01-01

## 2021-01-01 RX ORDER — CALCIUM GLUCONATE 100 MG/ML
2 VIAL (ML) INTRAVENOUS ONCE
Refills: 0 | Status: COMPLETED | OUTPATIENT
Start: 2021-01-01 | End: 2021-01-01

## 2021-01-01 RX ORDER — CEFTRIAXONE 500 MG/1
2000 INJECTION, POWDER, FOR SOLUTION INTRAMUSCULAR; INTRAVENOUS EVERY 24 HOURS
Refills: 0 | Status: DISCONTINUED | OUTPATIENT
Start: 2021-01-01 | End: 2021-01-01

## 2021-01-01 RX ORDER — DEXTROSE 50 % IN WATER 50 %
50 SYRINGE (ML) INTRAVENOUS ONCE
Refills: 0 | Status: COMPLETED | OUTPATIENT
Start: 2021-01-01 | End: 2021-01-01

## 2021-01-01 RX ORDER — HYDROMORPHONE HYDROCHLORIDE 2 MG/ML
0.25 INJECTION INTRAMUSCULAR; INTRAVENOUS; SUBCUTANEOUS EVERY 6 HOURS
Refills: 0 | Status: DISCONTINUED | OUTPATIENT
Start: 2021-01-01 | End: 2021-01-01

## 2021-01-01 RX ORDER — SODIUM CHLORIDE 9 MG/ML
1000 INJECTION, SOLUTION INTRAVENOUS ONCE
Refills: 0 | Status: DISCONTINUED | OUTPATIENT
Start: 2021-01-01 | End: 2021-01-01

## 2021-01-01 RX ORDER — PROTHROMBIN COMPLEX CONCENTRATE (HUMAN) 25.5; 16.5; 24; 22; 22; 26 [IU]/ML; [IU]/ML; [IU]/ML; [IU]/ML; [IU]/ML; [IU]/ML
1500 POWDER, FOR SOLUTION INTRAVENOUS ONCE
Refills: 0 | Status: DISCONTINUED | OUTPATIENT
Start: 2021-01-01 | End: 2021-01-01

## 2021-01-01 RX ADMIN — HYDROMORPHONE HYDROCHLORIDE 0.5 MILLIGRAM(S): 2 INJECTION INTRAMUSCULAR; INTRAVENOUS; SUBCUTANEOUS at 09:58

## 2021-01-01 RX ADMIN — HYDROMORPHONE HYDROCHLORIDE 0.25 MILLIGRAM(S): 2 INJECTION INTRAMUSCULAR; INTRAVENOUS; SUBCUTANEOUS at 07:02

## 2021-01-01 RX ADMIN — HYDROMORPHONE HYDROCHLORIDE 0.25 MILLIGRAM(S): 2 INJECTION INTRAMUSCULAR; INTRAVENOUS; SUBCUTANEOUS at 01:26

## 2021-01-01 RX ADMIN — Medication 50 MILLILITER(S): at 16:59

## 2021-01-01 RX ADMIN — HYDROMORPHONE HYDROCHLORIDE 0.5 MILLIGRAM(S): 2 INJECTION INTRAMUSCULAR; INTRAVENOUS; SUBCUTANEOUS at 11:00

## 2021-01-01 RX ADMIN — SODIUM CHLORIDE 500 MILLILITER(S): 9 INJECTION, SOLUTION INTRAVENOUS at 16:01

## 2021-01-01 RX ADMIN — INSULIN HUMAN 7 UNIT(S): 100 INJECTION, SOLUTION SUBCUTANEOUS at 17:02

## 2021-01-01 RX ADMIN — Medication 5 MILLIGRAM(S): at 23:22

## 2021-01-01 RX ADMIN — Medication 5 MILLIGRAM(S): at 10:45

## 2021-01-01 RX ADMIN — HYDROMORPHONE HYDROCHLORIDE 0.25 MILLIGRAM(S): 2 INJECTION INTRAMUSCULAR; INTRAVENOUS; SUBCUTANEOUS at 17:12

## 2021-01-01 RX ADMIN — HYDROMORPHONE HYDROCHLORIDE 0.25 MILLIGRAM(S): 2 INJECTION INTRAMUSCULAR; INTRAVENOUS; SUBCUTANEOUS at 09:06

## 2021-01-01 RX ADMIN — HALOPERIDOL DECANOATE 1 MILLIGRAM(S): 100 INJECTION INTRAMUSCULAR at 17:39

## 2021-01-01 RX ADMIN — HYDROMORPHONE HYDROCHLORIDE 0.25 MILLIGRAM(S): 2 INJECTION INTRAMUSCULAR; INTRAVENOUS; SUBCUTANEOUS at 15:37

## 2021-01-01 RX ADMIN — HYDROMORPHONE HYDROCHLORIDE 0.25 MILLIGRAM(S): 2 INJECTION INTRAMUSCULAR; INTRAVENOUS; SUBCUTANEOUS at 01:41

## 2021-01-01 RX ADMIN — CEFTRIAXONE 100 MILLIGRAM(S): 500 INJECTION, POWDER, FOR SOLUTION INTRAMUSCULAR; INTRAVENOUS at 04:18

## 2021-01-01 RX ADMIN — HYDROMORPHONE HYDROCHLORIDE 0.25 MILLIGRAM(S): 2 INJECTION INTRAMUSCULAR; INTRAVENOUS; SUBCUTANEOUS at 23:23

## 2021-01-01 RX ADMIN — HYDROMORPHONE HYDROCHLORIDE 0.5 MILLIGRAM(S): 2 INJECTION INTRAMUSCULAR; INTRAVENOUS; SUBCUTANEOUS at 10:00

## 2021-01-01 RX ADMIN — HYDROMORPHONE HYDROCHLORIDE 0.25 MILLIGRAM(S): 2 INJECTION INTRAMUSCULAR; INTRAVENOUS; SUBCUTANEOUS at 17:30

## 2021-01-01 RX ADMIN — HYDROMORPHONE HYDROCHLORIDE 0.5 MILLIGRAM(S): 2 INJECTION INTRAMUSCULAR; INTRAVENOUS; SUBCUTANEOUS at 10:51

## 2021-01-01 RX ADMIN — Medication 5 MILLIGRAM(S): at 05:45

## 2021-01-01 RX ADMIN — CEFTRIAXONE 100 MILLIGRAM(S): 500 INJECTION, POWDER, FOR SOLUTION INTRAMUSCULAR; INTRAVENOUS at 05:22

## 2021-01-01 RX ADMIN — Medication 40 MILLIGRAM(S): at 05:21

## 2021-01-01 RX ADMIN — HYDROMORPHONE HYDROCHLORIDE 0.25 MILLIGRAM(S): 2 INJECTION INTRAMUSCULAR; INTRAVENOUS; SUBCUTANEOUS at 21:22

## 2021-01-01 RX ADMIN — Medication 0.5 MILLIGRAM(S): at 23:49

## 2021-01-01 RX ADMIN — HYDROMORPHONE HYDROCHLORIDE 0.25 MILLIGRAM(S): 2 INJECTION INTRAMUSCULAR; INTRAVENOUS; SUBCUTANEOUS at 21:37

## 2021-01-01 RX ADMIN — Medication 5 MILLIGRAM(S): at 05:21

## 2021-01-01 RX ADMIN — HYDROMORPHONE HYDROCHLORIDE 0.25 MILLIGRAM(S): 2 INJECTION INTRAMUSCULAR; INTRAVENOUS; SUBCUTANEOUS at 04:17

## 2021-01-01 RX ADMIN — HYDROMORPHONE HYDROCHLORIDE 0.25 MILLIGRAM(S): 2 INJECTION INTRAMUSCULAR; INTRAVENOUS; SUBCUTANEOUS at 23:38

## 2021-01-01 RX ADMIN — HYDROMORPHONE HYDROCHLORIDE 0.25 MILLIGRAM(S): 2 INJECTION INTRAMUSCULAR; INTRAVENOUS; SUBCUTANEOUS at 19:40

## 2021-01-01 RX ADMIN — PROTHROMBIN COMPLEX CONCENTRATE (HUMAN) 400 INTERNATIONAL UNIT(S): 25.5; 16.5; 24; 22; 22; 26 POWDER, FOR SOLUTION INTRAVENOUS at 23:53

## 2021-01-01 RX ADMIN — Medication 200 GRAM(S): at 16:16

## 2021-01-01 RX ADMIN — HALOPERIDOL DECANOATE 1 MILLIGRAM(S): 100 INJECTION INTRAMUSCULAR at 23:49

## 2021-01-01 RX ADMIN — Medication 5 MILLIGRAM(S): at 17:16

## 2021-01-01 RX ADMIN — HYDROMORPHONE HYDROCHLORIDE 0.25 MILLIGRAM(S): 2 INJECTION INTRAMUSCULAR; INTRAVENOUS; SUBCUTANEOUS at 09:25

## 2021-01-01 RX ADMIN — HYDROMORPHONE HYDROCHLORIDE 0.25 MILLIGRAM(S): 2 INJECTION INTRAMUSCULAR; INTRAVENOUS; SUBCUTANEOUS at 14:28

## 2021-01-01 RX ADMIN — HYDROMORPHONE HYDROCHLORIDE 0.25 MILLIGRAM(S): 2 INJECTION INTRAMUSCULAR; INTRAVENOUS; SUBCUTANEOUS at 06:47

## 2021-01-01 RX ADMIN — BUMETANIDE 1 MILLIGRAM(S): 0.25 INJECTION INTRAMUSCULAR; INTRAVENOUS at 07:20

## 2021-01-01 RX ADMIN — HYDROMORPHONE HYDROCHLORIDE 0.25 MILLIGRAM(S): 2 INJECTION INTRAMUSCULAR; INTRAVENOUS; SUBCUTANEOUS at 04:02

## 2021-01-01 RX ADMIN — HYDROMORPHONE HYDROCHLORIDE 0.25 MILLIGRAM(S): 2 INJECTION INTRAMUSCULAR; INTRAVENOUS; SUBCUTANEOUS at 19:18

## 2021-01-11 PROBLEM — Z79.01 ANTICOAGULANT LONG-TERM USE: Status: ACTIVE | Noted: 2019-01-09

## 2021-01-11 PROBLEM — G30.9 ALZHEIMER'S DEMENTIA WITHOUT BEHAVIORAL DISTURBANCE, UNSPECIFIED TIMING OF DEMENTIA ONSET: Status: ACTIVE | Noted: 2020-02-28

## 2021-01-11 PROBLEM — R41.0 CONFUSION AND DISORIENTATION: Status: ACTIVE | Noted: 2020-03-02

## 2021-01-11 PROBLEM — R63.4 UNINTENTIONAL WEIGHT LOSS: Status: ACTIVE | Noted: 2019-01-09

## 2021-01-11 NOTE — HISTORY OF PRESENT ILLNESS
[FreeTextEntry8] : The patient was brought in because her daughter cooled that she was having slurred speech.  The patient was somewhat confused and was felt not to be ill reliable historian so her daughter acted as an independent historian to give me this history.  Periodically the patient developed slurred speech and at times is also having auditory hallucinations. \par She also has trouble falling  sleep.\par \par Patient denies shortness of breath or chest pain.

## 2021-01-11 NOTE — ASSESSMENT
[FreeTextEntry1] : The patient has a mild Alzheimer's dementia.  She has progressed to the fact that she has become a little more argumentative and now has auditory hallucinations at times.  I discussed with daughter in length about starting medication like Seroquel but I do have some reservations at her age that she may have some side effects and this will be difficult to monitor as the daughter does not live with her.  The daughter agreed that at the present time we will not start any new medication.  I made it clear that if she changes her mind and that mother becomes more difficult to deal with that we would add Seroquel 25 mg at bedtime.  I do believe the patient is a fall risk so starting any new medication that may be somewhat sedating could be an issue.\par \par Neurologically she seems completely intact as there were no focal findings evident.  Her speech appeared normal.  I did not detect any slurring of the speech.  My best assumption is that she gets occasional TIAs which causes her slurred speech to slur.  However at this age I am not anxious in particular of the Covid setting now to start sending her for other tests.\par \par She is planning to have a Covid vaccine tomorrow at [facility].\par \par I have spent 60 minutes with this patient both in face-to-face time and reviewing the pertinent information in the chart.\par \par I will get pertinent laboratory data and a urine culture and urinalysis to make sure that there is no component of urinary tract infection which had caused her to be confused in the past.\par \par Her electrocardiogram shows stable atrial fibrillation.

## 2021-01-11 NOTE — PHYSICAL EXAM
[Normal] : normal rate, regular rhythm, normal S1 and S2 and no murmur heard [No Edema] : there was no peripheral edema [de-identified] : No peripheral pulses were felt on the feet. [de-identified] : Marked discoloration of both feet they appear blue.  However these changes are chronic in nature.

## 2021-11-19 NOTE — ED PROVIDER NOTE - OBJECTIVE STATEMENT
89yo F hx of Afib on Eliquis, frequent uti's, HTN, HLD, dementia comes to ED for frequent falls. Pt at home w/ home health aid, multiple falls off of bed, today sustained R leg skin tear but otherwise per daughter at baseline mental status. Fall unwitness, carpetted floor. Pt unable to endorse symptoms. Per daughter, pt was recently prescribed a course of bactrim by family member given hx of UTIs. No fever, sob, change in bowel, is covid vaccinated.

## 2021-11-19 NOTE — ED PROVIDER NOTE - PROGRESS NOTE DETAILS
Attending MD Sotelo.  Pt signed out to me in stable condition pending UA, TBA, 89 yo fem recent frequent falls, appears to have poss intraabdominal hematoma, s/p fall, Hgb stable, eliquis for afib, Cr 3, no contrast, no surg, TBA for obs. Ham PGY3: Spoke to family regarding the CT scan results that may represent a possible bleed, family opted not to repeat CT scan as they would not want surgical intervention if warranted. Further clarified goals of care, family stated that want patient to be DNR/DNI as it best aligns with patients GOC. However, given current potential for bleeding, they would want patient to be transfused. Case endorsed to hospitalist, requested 80 of lasix for pulm edema, will admit to tele.

## 2021-11-19 NOTE — ED PROVIDER NOTE - ATTENDING CONTRIBUTION TO CARE
Patient is a 89 yo F with history of HTN, hyperlipidemia, afib on Eliquis, dementia brought in by daughter for decreased eating and drinking, confused and agitated behavior, worse in the past 3 days. Per daughter, patient resides with a friend who helps to care for her, has been decompensating over the past 10 days. Her  is a physician, prescribed her Bactrim for a presumed UTI. However, patient developed diarrhea. She refuses to use a wheelchair at home, has had some falls at home per daughter. Daughter is concerned that patient is unable to continue living with this arrangement as It seems unsafe.     VS noted  Gen. no acute distress, Non toxic, elderly  HEENT: right eye anisicoria 3 mm, left eye 1 mm, reactive, EOMI, dry MM  Lungs: CTAB/L no C/ W /R   CVS: RRR  Abd; Soft non tender, non distended   Pelvis: stable, no ttp, bruise right hip  Ext: RLE: skin tear 2 cm, b/l LE with bluish hue, toes cool to touch (chronic per daughter)  Skin: chronic LE skin changes  Neuro:  alert, non focal, clear speech  a/p: s/p fall - concern for dehydration, NICOLAS, electrolyte abnormalities. Concern for UTI. Will r/o ICH. Plan for CT Head, CT C-spine, CT Chest, CT A/P. Will check labs, ekg. TBA.   - Clement LEONARD Patient is a 89 yo F with history of HTN, hyperlipidemia, afib on Eliquis, dementia brought in by daughter for decreased eating and drinking, confused and agitated behavior, worse in the past 3 days. Per daughter, patient resides with a friend who helps to care for her, has been decompensating over the past 10 days. Her  is a physician, prescribed her Bactrim for a presumed UTI. However, patient developed diarrhea. She refuses to use a wheelchair at home, has had some falls at home per daughter. Daughter is concerned that patient is unable to continue living with this arrangement as It seems unsafe.     Daughter: Ally: [phone]    VS noted  Gen. no acute distress, Non toxic, elderly  HEENT: right eye anisicoria 3 mm, left eye 1 mm, reactive, EOMI, dry MM  Lungs: CTAB/L no C/ W /R   CVS: RRR  Abd; Soft non tender, non distended   Pelvis: stable, no ttp, bruise right hip  Ext: RLE: skin tear 2 cm, b/l LE with bluish hue, toes cool to touch (chronic per daughter)  Skin: chronic LE skin changes  Neuro:  alert, non focal, clear speech  a/p: s/p fall - concern for dehydration, NICOLAS, electrolyte abnormalities. Concern for UTI. Will r/o ICH. Plan for CT Head, CT C-spine, CT Chest, CT A/P. Will check labs, ekg. TBA.   - Clement LEONARD

## 2021-11-19 NOTE — ED PROVIDER NOTE - CLINICAL SUMMARY MEDICAL DECISION MAKING FREE TEXT BOX
Elderly pt on eliquis here for fall w/ signs of minor trauma. Pt undressed w/ only findings being R hip abraision and R tib/fib skin tear. VSS. Trauma scans, labs, ekg, cardiac markers, coags, ua/ucx, reassess.

## 2021-11-19 NOTE — ED ADULT NURSE NOTE - OBJECTIVE STATEMENT
Pt received to room 14 presents with bouts of intermittent confusion beginning last week. Pt accompanied by daughter who is acting as primary historian. As per daughter, pt "has been falling, and has become incontinent". Pt arrives with skin tear located on anterior rt leg, hydrocolloid placed on laceration. Pt a&ox2, ambulates with walker at baseline, respirations even and unlabored, abd soft and non-distended, non-tender to palpation. 18G Iv placed in rt arm by float RN, and 20g IV placed in left arm by EM Mireles. Pt attached to prima cath, placed on CM, await further orders and continue to monitor.

## 2021-11-19 NOTE — ED ADULT NURSE REASSESSMENT NOTE - NS ED NURSE REASSESS COMMENT FT1
Pt soiled stretcher, changed at bedside, repeat troponin drawn and sent, pt afib on CM, pt daughter remains at bedside, will continue to monitor.

## 2021-11-19 NOTE — ED ADULT NURSE REASSESSMENT NOTE - NS ED NURSE REASSESS COMMENT FT1
Tele tech called to make aware pt heart rate went up to 140, pt heart rate currently back to 125, MD Cheek made aware, no new orders currently, MD hCeek assessing pt at bedside, will continue to monitor.

## 2021-11-19 NOTE — ED ADULT TRIAGE NOTE - CHIEF COMPLAINT QUOTE
Patient brought to ER by EMS from home for c/o confusion, incontinence, dehydration since last week. Pt has been falling down more than usual. Pt has a walker but does not use it. Pt received bloodwork today. Pt has dark venous insufficiency on both legs.  Daughter:Heather: [phone]

## 2021-11-19 NOTE — ED PROVIDER NOTE - PHYSICAL EXAMINATION
General: NAD  HEENT: NCAT, 3+ R pupil reactive, 2+ L reactive, non-tender c-spine  Cardiac: RRR, no murmurs, 2+ peripheral pulses  Chest: CTA  Abdomen: soft, non-distended, no ttp, no rebound or guarding  Extremities: +bilateral pitting LE edema +skin tear anterior R tib fib +abrasion on R iliac crest, stable pelvis, passive rom of legs intact  Skin: no rashes  Neuro: AAOx1, motor and sensory grossly intact  Psych: +pleasant

## 2021-11-19 NOTE — ED PROVIDER NOTE - NSICDXPASTMEDICALHX_GEN_ALL_CORE_FT
PAST MEDICAL HISTORY:  Atrial fibrillation     CKD (chronic kidney disease) stage 3, GFR 30-59 ml/min     Hard of hearing     Hiatal hernia     HLD (hyperlipidemia)     HTN (hypertension)     Lower abdominal pain, unspecified     Pulmonary emboli 20 years ago, n AC currently    PVD (peripheral vascular disease)

## 2021-11-19 NOTE — ED ADULT NURSE NOTE - CHIEF COMPLAINT QUOTE
Patient brought to ER by EMS from home for c/o confusion, incontinence, dehydration since last week. Pt has been falling down more than usual. Pt has a walker but does not use it. Pt received bloodwork today. Pt has dark venous insufficiency on both legs.  Daughter:Heather: [phone]

## 2021-11-20 NOTE — H&P ADULT - PROBLEM SELECTOR PLAN 2
-hematoma of flank and hepatic region  -2/2 fall likely from dementia, but patient has had frequent falls prior as well  -patient has overall poor outcome, with advanced dementia, family in agreement for more conservative management  -Kcentra given to reverse eliquis  -general surgery consult in the morning for guidance on conservative management

## 2021-11-20 NOTE — GOALS OF CARE CONVERSATION - ADVANCED CARE PLANNING - CONVERSATION DETAILS
Discussed with the patient's daughter and grandson overall decline in patient's condition. With her fall, respiratory distress, and flank hematoma w/ perihepatic involvement, and her dementia and baseline memory impairment. Discussed code status in terms of chest compressions or intubation, and family is in agreement that patient would want a natural death. Patient would not have wanted cardiopulmonary resuscitation or intubation.

## 2021-11-20 NOTE — H&P ADULT - NSHPSOURCEINFOTX_GEN_ALL_CORE
Sandra Segal daughter, Jim Segal grandson Sandra Luzma daughter [phone], Jim Segal grandson [phone]

## 2021-11-20 NOTE — H&P ADULT - NSICDXPASTMEDICALHX_GEN_ALL_CORE_FT
PAST MEDICAL HISTORY:  Atrial fibrillation     CKD (chronic kidney disease) stage 3, GFR 30-59 ml/min     Hard of hearing     Hiatal hernia     HLD (hyperlipidemia)     HTN (hypertension)     Lower abdominal pain, unspecified     Pulmonary emboli 20 years ago    PVD (peripheral vascular disease)

## 2021-11-20 NOTE — H&P ADULT - PROBLEM SELECTOR PLAN 7
-r/o syncope as above  -cannot rule out cardiac cause given hyperkalemia and afib history  -telemetry monitoring for now  -TTE would likely not   -troponin 110 likely in setting of CKD 4

## 2021-11-20 NOTE — H&P ADULT - PROBLEM SELECTOR PLAN 9
F-none  E-caution in CKD4  N-NPO now due to encephalopathy  hold dvt prophylaxis  DNR DNI MOLST in chart

## 2021-11-20 NOTE — H&P ADULT - HISTORY OF PRESENT ILLNESS
89 year old female afib on eliquis, CKD 4,  frequent UTIs, HTN, HLD, dementia baseline a/o times 1 who presents due to overall decline. Patient has had frequent falls in the past, and also had a fall unwitness today onto the floor. She has a home health aid as well. She was recently given bactrim by a family member for UTI, unsure why given CKD4. Patient's family state that at baseline she can hold nonsensical conversations. Has a vaccine against COVID. No episodes of fevers, chills, nausea, vomiting, diarrhea, cp, visual changes. ED course: head/cervical CT negative but CT abdomen/pelvis shows large right flank hematoma. Kcentra given to reverse eliquis. Given 1 L NS in ED. Tachypneic now w/ labored breathing and crackles, given 40 iv lasix times 2. HR elevated 110s-120s, given lopressor 5 mg times 1. Agitated and given haldol/ativan. Potassium initially 6.4 then 5.1    ED vitals: Afebrile, , /80, RR 24, 98% RA

## 2021-11-20 NOTE — H&P ADULT - PROBLEM SELECTOR PLAN 8
-afib now with RVR  -probably component of pain as was 150s with pain, and 120s without  -dilaudid as above can increase slowly  -lopressor 5 mg iv Q6 prn HR >110

## 2021-11-20 NOTE — H&P ADULT - PROBLEM SELECTOR PLAN 6
-discussed with family listed above  -in agreement with DNR DNI status, and for no surgical intervention  -dilaudid 0.25 mg Q4 for respiratory distress or moderate pain  -palliative care consult  -MOLST in chart

## 2021-11-20 NOTE — H&P ADULT - ASSESSMENT
89 year old female afib on eliquis, CKD 4,  frequent UTIs, HTN, HLD, dementia baseline a/o times 1 who presents due to overall decline, with fall and large flank hematoma

## 2021-11-20 NOTE — H&P ADULT - PROBLEM SELECTOR PLAN 1
-patient with acute hypoxia likely 2/2 volume overload state  -unknown if baseline HF or not, TTE unlikely to change overall management of patient  -richardson placement and status post 80 mg IV lasix due to CKD4 needing higher dose      #Toxic metabolic encephalopathy  -likely 2/2 respiratory distress, fall, hematoma, and advanced dementia

## 2021-11-20 NOTE — H&P ADULT - NSHPLABSRESULTS_GEN_ALL_CORE
LABS:                         12.7   8.88  )-----------( 225      ( 2021 15:03 )             40.5     11-    139  |  111<H>  |  46<H>  ----------------------------<  109<H>  5.1   |  17<L>  |  2.91<H>    Ca    9.9      2021 17:57    TPro  6.5  /  Alb  3.8  /  TBili  0.3  /  DBili  x   /  AST  22  /  ALT  13  /  AlkPhos  51  11-19    PT/INR - ( 2021 15:03 )   PT: 14.2 sec;   INR: 1.26 ratio         PTT - ( 2021 15:03 )  PTT:32.2 sec  Urinalysis Basic - ( 2021 00:07 )    Color: Yellow / Appearance: Slightly Turbid / S.040 / pH: x  Gluc: x / Ketone: Negative  / Bili: Small / Urobili: <2 mg/dL   Blood: x / Protein: 100 mg/dL / Nitrite: Negative   Leuk Esterase: Moderate / RBC: 1 /HPF / WBC 1 /HPF   Sq Epi: x / Non Sq Epi: 3 /HPF / Bacteria: Negative      CARDIAC MARKERS ( 2021 15:04 )  x     / x     / 274 U/L / x     / x                RADIOLOGY, EKG & ADDITIONAL TESTS: Reviewed.

## 2021-11-20 NOTE — ED ADULT NURSE REASSESSMENT NOTE - NS ED NURSE REASSESS COMMENT FT1
family left room at this time/  family want to be called of any further change with pt/ or if pt gets room/  pt on cm  rate 120 additional meds given as ordered. supportive care given

## 2021-11-20 NOTE — H&P ADULT - NSHPPHYSICALEXAM_GEN_ALL_CORE
PHYSICAL EXAM:  GENERAL: NAD, well-developed  HEAD:  Atraumatic, Normocephalic  EYES: EOMI, PERRLA, conjunctiva and sclera clear  NECK: Supple, No JVD  CHEST/LUNG: Crackles bilaterally lower lung fields; No wheeze  HEART: Irregular rate and rhythm; No murmurs, rubs, or gallops, tachycardic  ABDOMEN: Soft, Nontender, Nondistended; Bowel sounds present  EXTREMITIES:  2+ Peripheral Pulses, No clubbing, cyanosis, or edema  PSYCH: AAOx0 not responding to name  NEUROLOGY: unable to assess due to patient condition  SKIN: large flank bruising noted

## 2021-11-20 NOTE — H&P ADULT - PROBLEM SELECTOR PLAN 3
-uti status post bactrim, tx w/ ceftriaxone for now    #Concern for pna  -tx w/ ceftriaxone add on procalcitonin if negative can d/c

## 2021-11-20 NOTE — ED ADULT NURSE REASSESSMENT NOTE - NS ED NURSE REASSESS COMMENT FT1
received report from nite nurse/ family at bedside pt is dnr and dni/  pt on cm nsr rate 104. pt non responsive and appears to be sleeping and comfortable

## 2021-11-20 NOTE — ED ADULT NURSE REASSESSMENT NOTE - NS ED NURSE REASSESS COMMENT FT1
Patient found to be unresponsive to voice or pain stimuli. Leiva catheter drained 70 ml residual urine immediately following insertion. Dr Bowman made aware about patient's condition.

## 2021-11-20 NOTE — H&P ADULT - PROBLEM SELECTOR PLAN 4
-likely 2/2 bactrim use and CKD4  -status post tx, w/ iv lasix and fluids  -K now 5.1  -continue to monitor

## 2021-11-21 NOTE — PROGRESS NOTE ADULT - PROBLEM SELECTOR PLAN 1
-improved s/p IV lasix due  -pt now comfort measure only per discussion with family at bedside; continue nasal O2 for comfort
-patient with acute hypoxia likely 2/2 volume overload state  -unknown if baseline HF or not, TTE unlikely to change overall management of patient  -richardson placement and status post 80 mg IV lasix due to CKD4 needing higher dose      #Toxic metabolic encephalopathy  -likely 2/2 respiratory distress, fall, hematoma, and advanced dementia

## 2021-11-21 NOTE — ED ADULT NURSE REASSESSMENT NOTE - NS ED NURSE REASSESS COMMENT FT1
received report from [employer]. Patient is a&xo on NC. 3 family member at bedside, attempted to do vital sigsn patient family does not want. [id number] ACP made aware.

## 2021-11-21 NOTE — PROGRESS NOTE ADULT - PROBLEM SELECTOR PLAN 6
-family in agreement with DNR/DNI, comfort measures only  -dilaudid 0.25 mg Q2H for respiratory distress or agitation  -palliative care consult to consider hospice  -TIFFANIEST in chart
-family in agreement with DNR DNI, no surgical intervention, and focus on palliation  -dilaudid 0.5 mg Q4 for respiratory distress or moderate pain  -palliative care consult  -MOLST in chart

## 2021-11-21 NOTE — PROGRESS NOTE ADULT - PROBLEM SELECTOR PLAN 4
-likely 2/2 bactrim use and CKD4  -status post tx, w/ iv lasix and fluids  -K now 5.1  -continue to monitor
-resolved with conservative measures  -no further lab draw per family request

## 2021-11-21 NOTE — PROGRESS NOTE ADULT - SUBJECTIVE AND OBJECTIVE BOX
Patient is a 89y old  Female who presents with a chief complaint of Syncope and fall (2021 13:06)      SUBJECTIVE / OVERNIGHT EVENTS: Pt mostly calm overnight. Did have brief restlessness this morning, resolved with dilaudid.    MEDICATIONS  (STANDING):    MEDICATIONS  (PRN):  HYDROmorphone  Injectable 0.25 milliGRAM(s) IV Push every 2 hours PRN SOB or agitation  metoprolol tartrate Injectable 5 milliGRAM(s) IV Push every 6 hours PRN HR >110      CAPILLARY BLOOD GLUCOSE        I&O's Summary      PHYSICAL EXAM:  Vital Signs Last 24 Hrs  T(C): 36.6 (2021 08:38), Max: 36.9 (2021 23:16)  T(F): 97.8 (2021 08:38), Max: 98.5 (2021 05:57)  HR: 129 (2021 08:38) (108 - 136)  BP: 108/64 (2021 08:38) (99/60 - 109/72)  BP(mean): --  RR: 20 (2021 08:38) (16 - 20)  SpO2: 95% (2021 08:38) (95% - 100%)  CONSTITUTIONAL: NAD, somnolent  EYES: PERRLA; conjunctiva and sclera clear  ENMT: Moist oral mucosa, no pharyngeal injection or exudates; normal dentition  RESPIRATORY: Normal respiratory effort; lungs are clear to auscultation bilaterally  CARDIOVASCULAR: Regular rate and rhythm, normal S1 and S2, no murmur/rub/gallop; No lower extremity edema; Peripheral pulses are 2+ bilaterally  ABDOMEN: Nontender to palpation, normoactive bowel sounds, no rebound/guarding; No hepatosplenomegaly    LABS:                        12.9   13.93 )-----------( 199      ( 2021 05:59 )             45.1     11-21    137  |  108<H>  |  63<H>  ----------------------------<  85  TNP   |  14<L>  |  3.77<H>    Ca    9.6      2021 05:59    TPro  6.5  /  Alb  3.8  /  TBili  0.3  /  DBili  x   /  AST  22  /  ALT  13  /  AlkPhos  51  11-19    PT/INR - ( 2021 15:03 )   PT: 14.2 sec;   INR: 1.26 ratio         PTT - ( 2021 15:03 )  PTT:32.2 sec  CARDIAC MARKERS ( 2021 15:04 )  x     / x     / 274 U/L / x     / x          Urinalysis Basic - ( 2021 00:07 )    Color: Yellow / Appearance: Slightly Turbid / S.040 / pH: x  Gluc: x / Ketone: Negative  / Bili: Small / Urobili: <2 mg/dL   Blood: x / Protein: 100 mg/dL / Nitrite: Negative   Leuk Esterase: Moderate / RBC: 1 /HPF / WBC 1 /HPF   Sq Epi: x / Non Sq Epi: 3 /HPF / Bacteria: Negative        Culture - Blood (collected 2021 18:31)  Source: .Blood Blood  Preliminary Report (2021 19:01):    No growth to date.    Culture - Blood (collected 2021 18:31)  Source: .Blood Blood  Gram Stain (2021 18:42):    Growth in aerobic bottle: Gram positive cocci in pairs  Preliminary Report (2021 18:43):    Growth in aerobic bottle: Gram positive cocci in pairs    ***Blood Panel PCR results on this specimen are available    approximately 3 hours after the Gram stain result.***    Gram stain, PCR, and/or culture results may not always    correspond due to difference in methodologies.    ************************************************************    This PCR assay was performed by multiplex PCR. This    Assay tests for 66 bacterial and resistance gene targets.    Please refer to the [facility] test directory    at [email or web address] for details.  Organism: Blood Culture PCR (2021 21:13)  Organism: Blood Culture PCR (2021 21:13)    
    Patient is a 89y old  Female who presents with a chief complaint of Syncope and fall (2021 04:30)      SUBJECTIVE / OVERNIGHT EVENTS: Pt calm earlier this morning but now somewhat restless in bed.    MEDICATIONS  (STANDING):  cefTRIAXone   IVPB 2000 milliGRAM(s) IV Intermittent every 24 hours    MEDICATIONS  (PRN):  HYDROmorphone  Injectable 0.5 milliGRAM(s) IV Push every 4 hours PRN SOB or agitation  metoprolol tartrate Injectable 5 milliGRAM(s) IV Push every 6 hours PRN HR >110      CAPILLARY BLOOD GLUCOSE      POCT Blood Glucose.: 133 mg/dL (2021 17:38)  POCT Blood Glucose.: 80 mg/dL (2021 16:50)    I&O's Summary      PHYSICAL EXAM:  Vital Signs Last 24 Hrs  T(C): 36.6 (2021 10:13), Max: 36.8 (2021 20:33)  T(F): 97.8 (2021 10:13), Max: 98.2 (2021 20:33)  HR: 108 (2021 12:44) (101 - 141)  BP: 117/70 (2021 10:40) (110/80 - 144/95)  BP(mean): --  RR: 18 (2021 12:44) (15 - 26)  SpO2: 100% (2021 12:44) (98% - 100%)  CONSTITUTIONAL: not responding to name, restless  EYES: PERRLA; conjunctiva and sclera clear  ENMT: Moist oral mucosa, no pharyngeal injection or exudates; normal dentition  RESPIRATORY: Normal respiratory effort; lungs are clear to auscultation bilaterally  CARDIOVASCULAR: Regular rate and rhythm, normal S1 and S2, no murmur/rub/gallop; No lower extremity edema; Peripheral pulses are 2+ bilaterally  ABDOMEN: Nontender to palpation, normoactive bowel sounds, no rebound/guarding; No hepatosplenomegaly    LABS:                        12.8   10.16 )-----------( 204      ( 2021 05:23 )             42.1     11-19    139  |  111<H>  |  46<H>  ----------------------------<  109<H>  5.1   |  17<L>  |  2.91<H>    Ca    9.9      2021 17:57    TPro  6.5  /  Alb  3.8  /  TBili  0.3  /  DBili  x   /  AST  22  /  ALT  13  /  AlkPhos  51  11-19    PT/INR - ( 2021 15:03 )   PT: 14.2 sec;   INR: 1.26 ratio         PTT - ( 2021 15:03 )  PTT:32.2 sec  CARDIAC MARKERS ( 2021 15:04 )  x     / x     / 274 U/L / x     / x          Urinalysis Basic - ( 2021 00:07 )    Color: Yellow / Appearance: Slightly Turbid / S.040 / pH: x  Gluc: x / Ketone: Negative  / Bili: Small / Urobili: <2 mg/dL   Blood: x / Protein: 100 mg/dL / Nitrite: Negative   Leuk Esterase: Moderate / RBC: 1 /HPF / WBC 1 /HPF   Sq Epi: x / Non Sq Epi: 3 /HPF / Bacteria: Negative          RADIOLOGY & ADDITIONAL TESTS:  Results Reviewed:   Imaging Personally Reviewed:  Electrocardiogram Personally Reviewed:    COORDINATION OF CARE:  Care Discussed with Consultants/Other Providers [Y/N]:  Prior or Outpatient Records Reviewed [Y/N]:

## 2021-11-21 NOTE — ED ADULT NURSE REASSESSMENT NOTE - NS ED NURSE REASSESS COMMENT FT1
ACP aware of HR. Critical LAB Value of +blood cultures, acp notified. No further interventions at this time. Will continue to monitor

## 2021-11-21 NOTE — PROGRESS NOTE ADULT - PROBLEM SELECTOR PLAN 7
-cannot rule out cardiac cause given hyperkalemia and afib history  -d/c telemetry given comfort measures only  -TTE would likely not   -troponin 110 likely in setting of CKD 4
-r/o syncope as above  -cannot rule out cardiac cause given hyperkalemia and afib history  -telemetry monitoring for now  -TTE would likely not   -troponin 110 likely in setting of CKD 4

## 2021-11-21 NOTE — ED ADULT NURSE REASSESSMENT NOTE - NS ED NURSE REASSESS COMMENT FT1
pt comfort measures/ palliative care, family at bedside. cm d/c'ed at this time. pt appears comfortable.

## 2021-11-21 NOTE — PROGRESS NOTE ADULT - ASSESSMENT
89 year old female afib on eliquis, CKD 4,  frequent UTIs, HTN, HLD, dementia baseline a/o times 1 who presents with FTT, with fall and large flank hematoma
89 year old female afib on eliquis, CKD 4,  frequent UTIs, HTN, HLD, dementia baseline a/o times 1 who presents due to overall decline, with fall and large flank hematoma

## 2021-11-21 NOTE — PROGRESS NOTE ADULT - PROBLEM SELECTOR PLAN 9
NPO    DNR/DNI MOLST in chart
F-none  E-caution in CKD4  N-NPO now due to encephalopathy  hold dvt prophylaxis  DNR DNI MOLST in chart

## 2021-11-21 NOTE — PROGRESS NOTE ADULT - PROBLEM SELECTOR PLAN 2
-hematoma of flank and hepatic region  -2/2 fall likely from dementia, but patient has had frequent falls prior as well  -patient has overall poor outcome, with advanced dementia, family in agreement for more conservative management  -Kcentra given to reverse eliquis
-hematoma of flank and hepatic region  -2/2 fall likely from dementia, but patient has had frequent falls prior as well  -patient has overall poor outcome, with advanced dementia, family in agreement for more conservative management  -Kcentra given to reverse eliquis

## 2021-11-21 NOTE — SWALLOW BEDSIDE ASSESSMENT ADULT - COMMENTS
As per Hospitalist Note on 11/21/21: 89 year old female afib on eliquis, CKD 4,  frequent UTIs, HTN, HLD, dementia baseline a/o times 1 who presents with FTT, with fall and large flank hematoma"    Patient attempted to be seen this PM for a clinical assessment of swallow function. As per RN, patient currently on comfort measures and family is declining further intervention (i.e. blood drawn, vital taken, etc). Patient received supine in bed, asleep, with family at bedside. Family declining speech and swallow evaluation. Page made to ACP team. Re-consult this service as indicated.

## 2021-11-21 NOTE — ED ADULT NURSE REASSESSMENT NOTE - NS ED NURSE REASSESS COMMENT FT1
Patient resting comfortably. Afib on monitor. New 22G IV placed to R hand. Turned/cleaned/repositioned for comfort.

## 2021-11-21 NOTE — ED ADULT NURSE REASSESSMENT NOTE - NS ED NURSE REASSESS COMMENT FT1
Received report from primary RN Carlos, pt is sleeping at this time, currently in A.fib on the monitor. Pt appears comfortable. Pending bed assignment. Fall precautions in placed.

## 2021-11-21 NOTE — PROGRESS NOTE ADULT - PROBLEM SELECTOR PLAN 3
-uti status post bactrim, tx w/ ceftriaxone for now    #Concern for pna  -tx w/ ceftriaxone add on procalcitonin if negative can d/c
-s/p two days of ceftriaxone  -family opting for comfort measure only and do not want to prolong pt's suffering, ok with discontinuing meds including abx that do not address comfort

## 2021-11-22 NOTE — CHART NOTE - NSCHARTNOTEFT_GEN_A_CORE
Notified by RN that patient without respirations. Patient seen and examined at bedside.     ENT: no corneal reflex present  Lungs: no chest rise and fall  Heart: no heart sounds  Abd: no bowel sounds   Vascular: no palpable radial or carotid pulse    Patient pronounced dead at 9:54AM.     1. Daughter Heather Segal notified of her mother's passing. She came to the bedside to be with her.  2. Family declined autopsy.  3. All questions answered and emotional support provided.  4. Dr. Byrnes attending notified.

## 2021-11-22 NOTE — DISCHARGE NOTE FOR THE EXPIRED PATIENT - HOSPITAL COURSE
89 year old female afib on eliquis, CKD 4,  frequent UTIs, HTN, HLD, dementia baseline a/o times 1 who presents with FTT, with fall and large flank hematoma    Acute respiratory failure with hypoxia.   - s/p IV lasix   - After GOC discussions with family , goal now comfort measure only; continue nasal O2 for comfort.    Hematoma of flank.    -hematoma of flank and hepatic region  -2/2 fall likely from dementia, but patient has had frequent falls prior as well  -patient has overall poor outcome, with advanced dementia, family in agreement for more conservative management  -Kcentra given to reverse eliquis.    Acute UTI.    -s/p two days of ceftriaxone  -family opting for comfort measure only and do not want to prolong pt's suffering, ok with discontinuing meds including abx that do not address comfort.    Hyperkalemia.   -resolved with conservative measures  -no further lab draw per family request.    Stage 4 chronic kidney disease.   -as above no further lab draw.    Counseling regarding goals of care.    -family in agreement with DNR/DNI, comfort measures only  -dilaudid 0.25 mg Q2H for respiratory distress or agitation  -MOLST in chart.    Syncope.    -cannot rule out cardiac cause given hyperkalemia and afib history  - d/c'd telemetry given comfort measures only  -troponin 110 likely in setting of CKD 4.    Chronic atrial fibrillation.    -afib now rate controlled  -probably component of pain  -dilaudid as above.    Patient kept comfortable with dilaudid PRN. She  on 21 at 9:54am. Family notified and is at bedside presently to be with patient. They declined autopsy. Dr. Byrnes notified of patient's expiration.

## 2021-11-23 LAB
COVID-19 NUCLEOCAPSID GAM AB INTERP: NEGATIVE — SIGNIFICANT CHANGE UP
COVID-19 NUCLEOCAPSID TOTAL GAM ANTIBODY RESULT: 0.07 INDEX — SIGNIFICANT CHANGE UP
SARS-COV-2 IGG+IGM SERPL QL IA: 0.07 INDEX — SIGNIFICANT CHANGE UP
SARS-COV-2 IGG+IGM SERPL QL IA: NEGATIVE — SIGNIFICANT CHANGE UP

## 2021-11-24 LAB
CULTURE RESULTS: SIGNIFICANT CHANGE UP
SPECIMEN SOURCE: SIGNIFICANT CHANGE UP

## 2024-08-14 NOTE — PATIENT PROFILE ADULT - NSASFALLNEEDSASSISTWITH_GEN_A_NUR
